# Patient Record
Sex: MALE | Race: WHITE | NOT HISPANIC OR LATINO | Employment: OTHER | URBAN - METROPOLITAN AREA
[De-identification: names, ages, dates, MRNs, and addresses within clinical notes are randomized per-mention and may not be internally consistent; named-entity substitution may affect disease eponyms.]

---

## 2024-05-17 ENCOUNTER — OFFICE VISIT (OUTPATIENT)
Dept: MEDICAL GROUP | Facility: MEDICAL CENTER | Age: 79
End: 2024-05-17
Payer: MEDICARE

## 2024-05-17 VITALS
RESPIRATION RATE: 16 BRPM | OXYGEN SATURATION: 93 % | WEIGHT: 140 LBS | TEMPERATURE: 97.6 F | DIASTOLIC BLOOD PRESSURE: 56 MMHG | HEIGHT: 72 IN | SYSTOLIC BLOOD PRESSURE: 104 MMHG | BODY MASS INDEX: 18.96 KG/M2 | HEART RATE: 87 BPM

## 2024-05-17 DIAGNOSIS — Z12.5 SCREENING PSA (PROSTATE SPECIFIC ANTIGEN): ICD-10-CM

## 2024-05-17 DIAGNOSIS — M15.9 GENERALIZED OA: ICD-10-CM

## 2024-05-17 DIAGNOSIS — N40.1 BENIGN PROSTATIC HYPERPLASIA WITH NOCTURIA: ICD-10-CM

## 2024-05-17 DIAGNOSIS — Z12.83 SKIN CANCER SCREENING: ICD-10-CM

## 2024-05-17 DIAGNOSIS — M81.0 AGE-RELATED OSTEOPOROSIS WITHOUT CURRENT PATHOLOGICAL FRACTURE: ICD-10-CM

## 2024-05-17 DIAGNOSIS — R53.82 CHRONIC FATIGUE: ICD-10-CM

## 2024-05-17 DIAGNOSIS — R35.1 BENIGN PROSTATIC HYPERPLASIA WITH NOCTURIA: ICD-10-CM

## 2024-05-17 DIAGNOSIS — E78.49 OTHER HYPERLIPIDEMIA: ICD-10-CM

## 2024-05-17 DIAGNOSIS — H04.123 DRY EYE SYNDROME OF BOTH EYES: ICD-10-CM

## 2024-05-17 DIAGNOSIS — K58.0 IRRITABLE BOWEL SYNDROME WITH DIARRHEA: ICD-10-CM

## 2024-05-17 PROBLEM — I34.1 MVP (MITRAL VALVE PROLAPSE): Status: ACTIVE | Noted: 2024-05-17

## 2024-05-17 PROCEDURE — 99204 OFFICE O/P NEW MOD 45 MIN: CPT | Performed by: PHYSICIAN ASSISTANT

## 2024-05-17 PROCEDURE — 3078F DIAST BP <80 MM HG: CPT | Performed by: PHYSICIAN ASSISTANT

## 2024-05-17 PROCEDURE — 3074F SYST BP LT 130 MM HG: CPT | Performed by: PHYSICIAN ASSISTANT

## 2024-05-17 RX ORDER — CYCLOSPORINE 0.5 MG/ML
1 EMULSION OPHTHALMIC 2 TIMES DAILY
COMMUNITY

## 2024-05-17 RX ORDER — ATORVASTATIN CALCIUM 10 MG/1
10 TABLET, FILM COATED ORAL DAILY
COMMUNITY
Start: 2024-04-16

## 2024-05-17 RX ORDER — TAMSULOSIN HYDROCHLORIDE 0.4 MG/1
0.8 CAPSULE ORAL
COMMUNITY
Start: 2024-05-10

## 2024-05-17 RX ORDER — TRAMADOL HYDROCHLORIDE 50 MG/1
50 TABLET ORAL
COMMUNITY
Start: 2024-04-13

## 2024-05-17 RX ORDER — CHOLESTYRAMINE 4 G/9G
1 POWDER, FOR SUSPENSION ORAL DAILY
COMMUNITY
Start: 2024-04-15

## 2024-05-17 ASSESSMENT — PATIENT HEALTH QUESTIONNAIRE - PHQ9: CLINICAL INTERPRETATION OF PHQ2 SCORE: 0

## 2024-05-17 NOTE — PROGRESS NOTES
Subjective:     History of Present Illness  The patient is an 79-year-old male who presents to the office to establish care.    The patient is diagnosed with severe osteoarthritis, which manifests in multiple joints. He has been under the care of a pain specialist for several years. His medical history includes a laminectomy, multiple steroid injections, physical therapy, acupuncture, and radiofrequency ablations in his neck and back. Initially, he experienced sciatica radiating down to his left ankle, which was managed with a laminectomy. However, the steroid injections administered by a pain specialist provided significant relief. Despite these interventions, he continues to experience widespread pain. He has undergone several MRIs of his neck and back. He was previously on Fosamax for osteoporosis, which was discontinued after a year. Currently, he is not on any medication, but he believes his bones are not in optimal condition. He is currently managing his pain with tramadol, which he takes twice daily.    The patient is currently on a regimen of Lipitor 10 mg.    The patient has not undergone a prostate blood test within the past year. He reports nocturia, waking up 2 to 3 times per night to urinate. He has previously tried tamsulosin. He also experiences urinary urgency and difficulty controlling his bladder, necessitating the use of pads during the day and at night.    The patient and his spouse visit a dermatologist. During their last dermatology visit, a liquid nitrogen freeze extinguisher is used. He has a history of sun exposure during his childhood.    The patient requests a referral to an ophthalmologist due to an incident where he scratched his cornea.  He has bilateral dry eyes. His corneal scarring has improved since starting Restasis..    The patient believes he may have contracted COVID-19 approximately a year ago. He experienced widespread pain, intense fatigue, altered gait, lightheadedness, mild  nausea, loss of appetite, and general malaise. His balance has not fully recovered, and he lost significant muscle mass and weight. Prior to his COVID-19 infection, he was in good health, engaging in physical activities such as climbing 15 flights of stairs and lifting weights. He enjoys skiing, but his endurance, stamina, and energy levels have decreased. His weight was 140 pounds today, down from 130 pounds last year. His weight has never been above 150 pounds.    Supplemental Information  He was referred to a cardiologist at [facility] for a mitral valve prolapse that he has had for a long time, which has never caused him any trouble, but they want to keep an eye on his mitral valve prolapse.   He is up-to-date on his shingles and pneumonia vaccines.      Current medicines (including changes today)  Current Outpatient Medications   Medication Sig Dispense Refill    atorvastatin (LIPITOR) 10 MG Tab Take 10 mg by mouth every day.      cholestyramine (QUESTRAN) 4 g packet Take 1 Packet by mouth every day.      tamsulosin (FLOMAX) 0.4 MG capsule Take 0.8 mg by mouth at bedtime.      cycloSPORINE (RESTASIS) 0.05 % ophthalmic emulsion Administer 1 Drop into both eyes 2 times a day.      traMADol (ULTRAM) 50 MG Tab Take 50 mg by mouth 2 times a day.       No current facility-administered medications for this visit.     He  has no past medical history on file.    ROS   No chest pain, no shortness of breath, no abdominal pain  Positive ROS as per HPI.  All other systems reviewed and are negative.     Objective:     /56 (BP Location: Right arm, Patient Position: Sitting, BP Cuff Size: Adult)   Pulse 87   Temp 36.4 °C (97.6 °F) (Temporal)   Resp 16   Ht 1.829 m (6')   Wt 63.5 kg (140 lb)   SpO2 93%  Body mass index is 18.99 kg/m².   Physical Exam    Constitutional: Alert, no distress.  Skin: Warm, dry, good turgor, no rashes in visible areas.  Eye: Equal, round and reactive, conjunctiva clear, lids  normal.  ENMT: Lips without lesions, good dentition, oropharynx clear.  Neck: Trachea midline, no masses, no thyromegaly. No cervical or supraclavicular lymphadenopathy  Respiratory: Unlabored respiratory effort, lungs clear to auscultation, no wheezes, no ronchi.  Cardiovascular: Normal S1, S2, no murmur, no edema.  Abdomen: Soft, non-tender, no masses, no hepatosplenomegaly.  Psych: Alert and oriented x3, normal affect and mood.      Results          Assessment and Plan:   The following treatment plan was discussed    Assessment & Plan  1. Osteoarthritis.  The patient's chronic condition remains uncontrolled, however, he is managing well with the administration of tramadol, administered twice daily. The patient has a sufficient supply of tramadol to last for a few more months. A follow-up appointment will be scheduled in 3 months, during which a referral to pain management will be made to establish care.    2. Hypercholesterolemia, the status of which is unknown.  Laboratory tests have been ordered, with instructions for the patient to fast for 8 hours prior to the test. The patient will maintain his current regimen of atorvastatin at 10 mg daily.    3. Benign Prostatic Hyperplasia (BPH) with nocturia and other urinary symptoms.  The patient will maintain his current regimen of Flomax. A referral to urology has been made to establish care.    4. Skin cancer screening.  A referral to dermatology has been made.    5. Dry eye syndrome, bilaterally.  The patient has been referred to ophthalmology and will continue using Restasis as directed.    6. Osteoporosis.  The patient is no longer on Fosamax. A DEXA scan has been ordered.    7. Irritable Bowel Syndrome (IBS) with diarrhea.  The patient will continue taking cholestyramine as directed. No refills are needed today.    8. Chronic fatigue.  The patient's chronic fatigue appears to be secondary to a COVID-19 infection. Laboratory tests have been ordered, with  instructions for the patient to fast for 8 hours prior to the test.      ORDERS:  1. Generalized OA    - traMADol (ULTRAM) 50 MG Tab; Take 50 mg by mouth 2 times a day.  - Referral to Pain Management  - Comp Metabolic Panel; Future    2. Other hyperlipidemia    - atorvastatin (LIPITOR) 10 MG Tab; Take 10 mg by mouth every day.  - Lipid Profile; Future    3. Benign prostatic hyperplasia with nocturia    - tamsulosin (FLOMAX) 0.4 MG capsule; Take 0.8 mg by mouth at bedtime.  - Referral to Urology    4. Skin cancer screening    - Referral to Dermatology    5. Dry eye syndrome of both eyes    - cycloSPORINE (RESTASIS) 0.05 % ophthalmic emulsion; Administer 1 Drop into both eyes 2 times a day.  - Referral to Ophthalmology    6. Age-related osteoporosis without current pathological fracture    - DS-BONE DENSITY STUDY (DEXA); Future    7. Irritable bowel syndrome with diarrhea    - cholestyramine (QUESTRAN) 4 g packet; Take 1 Packet by mouth every day.    8. Chronic fatigue    - CBC WITH DIFFERENTIAL; Future  - Comp Metabolic Panel; Future  - TSH WITH REFLEX TO FT4; Future  - CRP QUANTITIVE (NON-CARDIAC); Future  - Sed Rate; Future    9. Screening PSA (prostate specific antigen)    - PROSTATE SPECIFIC AG SCREENING; Future        Please note that this dictation was created using voice recognition software. I have made every reasonable attempt to correct obvious errors, but I expect that there are errors of grammar and possibly content that I did not discover before finalizing the note.      Attestation      Verbal consent was acquired by the patient to use [employer] ambient listening note generation during this visit Yes

## 2024-05-17 NOTE — LETTER
[facility]  Andrés Ballard P.A.-C.  [address]  [place] [address]  Fax: [phone]   Authorization for Release/Disclosure of   Protected Health Information   Name: CLEM PAGAN : 1945 SSN: [id number]   Address: [address] [place] [address] [place] [address] Phone:    [phone] (home)    I authorize the entity listed below to release/disclose the PHI below to:   [facility]/Andrés Ballard P.A.-C. and Andrés Ballard P.A.-C.   Provider or Entity Name:  [facility]   Address   City, State, Zip   Phone:      Fax:     Reason for request: continuity of care   Information to be released: All medical records   [  ] LAST COLONOSCOPY,  including any PATH REPORT and follow-up  [  ] LAST FIT/COLOGUARD RESULT [  ] LAST DEXA  [  ] LAST MAMMOGRAM  [  ] LAST PAP  [  ] LAST LABS [  ] RETINA EXAM REPORT  [  ] IMMUNIZATION RECORDS  [  ] Release all info      [  ] Check here and initial the line next to each item to release ALL health information INCLUDING  _____ Care and treatment for drug and / or alcohol abuse  _____ HIV testing, infection status, or AIDS  _____ Genetic Testing    DATES OF SERVICE OR TIME PERIOD TO BE DISCLOSED: _____________  I understand and acknowledge that:  * This Authorization may be revoked at any time by you in writing, except if your health information has already been used or disclosed.  * Your health information that will be used or disclosed as a result of you signing this authorization could be re-disclosed by the recipient. If this occurs, your re-disclosed health information may no longer be protected by State or Federal laws.  * You may refuse to sign this Authorization. Your refusal will not affect your ability to obtain treatment.  * This Authorization becomes effective upon signing and will  on (date) __________.      If no date is indicated, this Authorization will  one (1) year from the signature date.    Name: Clem Pagan  Signature:  Date:   5/17/2024     PLEASE FAX REQUESTED RECORDS BACK TO: [phone]

## 2024-06-06 ENCOUNTER — HOSPITAL ENCOUNTER (OUTPATIENT)
Dept: LAB | Facility: MEDICAL CENTER | Age: 79
End: 2024-06-06
Attending: PHYSICIAN ASSISTANT
Payer: MEDICARE

## 2024-06-06 DIAGNOSIS — M15.9 GENERALIZED OA: ICD-10-CM

## 2024-06-06 DIAGNOSIS — Z12.5 SCREENING PSA (PROSTATE SPECIFIC ANTIGEN): ICD-10-CM

## 2024-06-06 DIAGNOSIS — E78.49 OTHER HYPERLIPIDEMIA: ICD-10-CM

## 2024-06-06 DIAGNOSIS — R53.82 CHRONIC FATIGUE: ICD-10-CM

## 2024-06-06 LAB
ALBUMIN SERPL BCP-MCNC: 3.9 G/DL (ref 3.2–4.9)
ALBUMIN/GLOB SERPL: 1.5 G/DL
ALP SERPL-CCNC: 73 U/L (ref 30–99)
ALT SERPL-CCNC: 18 U/L (ref 2–50)
ANION GAP SERPL CALC-SCNC: 10 MMOL/L (ref 7–16)
AST SERPL-CCNC: 17 U/L (ref 12–45)
BASOPHILS # BLD AUTO: 0.9 % (ref 0–1.8)
BASOPHILS # BLD: 0.05 K/UL (ref 0–0.12)
BILIRUB SERPL-MCNC: 1.2 MG/DL (ref 0.1–1.5)
BUN SERPL-MCNC: 23 MG/DL (ref 8–22)
CALCIUM ALBUM COR SERPL-MCNC: 9 MG/DL (ref 8.5–10.5)
CALCIUM SERPL-MCNC: 8.9 MG/DL (ref 8.4–10.2)
CHLORIDE SERPL-SCNC: 101 MMOL/L (ref 96–112)
CHOLEST SERPL-MCNC: 165 MG/DL (ref 100–199)
CO2 SERPL-SCNC: 26 MMOL/L (ref 20–33)
CREAT SERPL-MCNC: 0.63 MG/DL (ref 0.5–1.4)
CRP SERPL HS-MCNC: <0.3 MG/DL (ref 0–0.75)
EOSINOPHIL # BLD AUTO: 0.03 K/UL (ref 0–0.51)
EOSINOPHIL NFR BLD: 0.5 % (ref 0–6.9)
ERYTHROCYTE [DISTWIDTH] IN BLOOD BY AUTOMATED COUNT: 46.3 FL (ref 35.9–50)
ERYTHROCYTE [SEDIMENTATION RATE] IN BLOOD BY WESTERGREN METHOD: 1 MM/HOUR (ref 0–20)
FASTING STATUS PATIENT QL REPORTED: NORMAL
GFR SERPLBLD CREATININE-BSD FMLA CKD-EPI: 96 ML/MIN/1.73 M 2
GLOBULIN SER CALC-MCNC: 2.6 G/DL (ref 1.9–3.5)
GLUCOSE SERPL-MCNC: 99 MG/DL (ref 65–99)
HCT VFR BLD AUTO: 47.2 % (ref 42–52)
HDLC SERPL-MCNC: 64 MG/DL
HGB BLD-MCNC: 15.9 G/DL (ref 14–18)
IMM GRANULOCYTES # BLD AUTO: 0.01 K/UL (ref 0–0.11)
IMM GRANULOCYTES NFR BLD AUTO: 0.2 % (ref 0–0.9)
LDLC SERPL CALC-MCNC: 87 MG/DL
LYMPHOCYTES # BLD AUTO: 1 K/UL (ref 1–4.8)
LYMPHOCYTES NFR BLD: 18.2 % (ref 22–41)
MCH RBC QN AUTO: 33.1 PG (ref 27–33)
MCHC RBC AUTO-ENTMCNC: 33.7 G/DL (ref 32.3–36.5)
MCV RBC AUTO: 98.1 FL (ref 81.4–97.8)
MONOCYTES # BLD AUTO: 0.58 K/UL (ref 0–0.85)
MONOCYTES NFR BLD AUTO: 10.5 % (ref 0–13.4)
NEUTROPHILS # BLD AUTO: 3.83 K/UL (ref 1.82–7.42)
NEUTROPHILS NFR BLD: 69.7 % (ref 44–72)
NRBC # BLD AUTO: 0 K/UL
NRBC BLD-RTO: 0 /100 WBC (ref 0–0.2)
PLATELET # BLD AUTO: 231 K/UL (ref 164–446)
PMV BLD AUTO: 8.9 FL (ref 9–12.9)
POTASSIUM SERPL-SCNC: 4 MMOL/L (ref 3.6–5.5)
PROT SERPL-MCNC: 6.5 G/DL (ref 6–8.2)
PSA SERPL-MCNC: 1.78 NG/ML (ref 0–4)
RBC # BLD AUTO: 4.81 M/UL (ref 4.7–6.1)
SODIUM SERPL-SCNC: 137 MMOL/L (ref 135–145)
TRIGL SERPL-MCNC: 68 MG/DL (ref 0–149)
TSH SERPL DL<=0.005 MIU/L-ACNC: 2.13 UIU/ML (ref 0.38–5.33)
WBC # BLD AUTO: 5.5 K/UL (ref 4.8–10.8)

## 2024-06-06 PROCEDURE — 85025 COMPLETE CBC W/AUTO DIFF WBC: CPT

## 2024-06-06 PROCEDURE — 84153 ASSAY OF PSA TOTAL: CPT

## 2024-06-06 PROCEDURE — 80061 LIPID PANEL: CPT

## 2024-06-06 PROCEDURE — 36415 COLL VENOUS BLD VENIPUNCTURE: CPT

## 2024-06-06 PROCEDURE — 84443 ASSAY THYROID STIM HORMONE: CPT

## 2024-06-06 PROCEDURE — 80053 COMPREHEN METABOLIC PANEL: CPT

## 2024-06-06 PROCEDURE — 85652 RBC SED RATE AUTOMATED: CPT

## 2024-06-06 PROCEDURE — 86140 C-REACTIVE PROTEIN: CPT

## 2024-06-11 ENCOUNTER — APPOINTMENT (RX ONLY)
Dept: URBAN - METROPOLITAN AREA CLINIC 15 | Facility: CLINIC | Age: 79
Setting detail: DERMATOLOGY
End: 2024-06-11

## 2024-06-11 DIAGNOSIS — L81.4 OTHER MELANIN HYPERPIGMENTATION: ICD-10-CM

## 2024-06-11 DIAGNOSIS — D18.0 HEMANGIOMA: ICD-10-CM

## 2024-06-11 DIAGNOSIS — B07.0 PLANTAR WART: ICD-10-CM

## 2024-06-11 DIAGNOSIS — R63.4 ABNORMAL WEIGHT LOSS: ICD-10-CM

## 2024-06-11 DIAGNOSIS — Z71.89 OTHER SPECIFIED COUNSELING: ICD-10-CM

## 2024-06-11 DIAGNOSIS — D22 MELANOCYTIC NEVI: ICD-10-CM

## 2024-06-11 DIAGNOSIS — L82.1 OTHER SEBORRHEIC KERATOSIS: ICD-10-CM

## 2024-06-11 DIAGNOSIS — L57.0 ACTINIC KERATOSIS: ICD-10-CM

## 2024-06-11 DIAGNOSIS — D69.2 OTHER NONTHROMBOCYTOPENIC PURPURA: ICD-10-CM

## 2024-06-11 DIAGNOSIS — L21.8 OTHER SEBORRHEIC DERMATITIS: ICD-10-CM | Status: INADEQUATELY CONTROLLED

## 2024-06-11 PROBLEM — D22.5 MELANOCYTIC NEVI OF TRUNK: Status: ACTIVE | Noted: 2024-06-11

## 2024-06-11 PROBLEM — D18.01 HEMANGIOMA OF SKIN AND SUBCUTANEOUS TISSUE: Status: ACTIVE | Noted: 2024-06-11

## 2024-06-11 PROCEDURE — ? DIAGNOSIS COMMENT

## 2024-06-11 PROCEDURE — ? PRESCRIPTION

## 2024-06-11 PROCEDURE — ? LIQUID NITROGEN

## 2024-06-11 PROCEDURE — 99204 OFFICE O/P NEW MOD 45 MIN: CPT | Mod: 25

## 2024-06-11 PROCEDURE — 17000 DESTRUCT PREMALG LESION: CPT

## 2024-06-11 PROCEDURE — ? COUNSELING

## 2024-06-11 PROCEDURE — 17003 DESTRUCT PREMALG LES 2-14: CPT

## 2024-06-11 PROCEDURE — ? REFERRAL CORRESPONDENCE

## 2024-06-11 RX ORDER — KETOCONAZOLE 20 MG/G
CREAM TOPICAL BID
Qty: 60 | Refills: 11 | Status: ERX | COMMUNITY
Start: 2024-06-11

## 2024-06-11 RX ADMIN — KETOCONAZOLE: 20 CREAM TOPICAL at 00:00

## 2024-06-11 ASSESSMENT — LOCATION SIMPLE DESCRIPTION DERM
LOCATION SIMPLE: CHEST
LOCATION SIMPLE: LEFT FOREHEAD
LOCATION SIMPLE: LEFT FOREARM
LOCATION SIMPLE: RIGHT LOWER BACK
LOCATION SIMPLE: RIGHT CHEEK
LOCATION SIMPLE: LEFT CHEEK
LOCATION SIMPLE: INFERIOR FOREHEAD
LOCATION SIMPLE: LEFT PLANTAR SURFACE
LOCATION SIMPLE: RIGHT FOREHEAD
LOCATION SIMPLE: LEFT UPPER BACK

## 2024-06-11 ASSESSMENT — LOCATION ZONE DERM
LOCATION ZONE: ARM
LOCATION ZONE: FACE
LOCATION ZONE: TRUNK
LOCATION ZONE: FEET

## 2024-06-11 ASSESSMENT — LOCATION DETAILED DESCRIPTION DERM
LOCATION DETAILED: LEFT INFERIOR MEDIAL FOREHEAD
LOCATION DETAILED: LEFT MEDIAL MALAR CHEEK
LOCATION DETAILED: LEFT PLANTAR FOREFOOT OVERLYING 4TH METATARSAL
LOCATION DETAILED: RIGHT MEDIAL FOREHEAD
LOCATION DETAILED: INFERIOR MID FOREHEAD
LOCATION DETAILED: RIGHT MEDIAL MALAR CHEEK
LOCATION DETAILED: LEFT INFERIOR UPPER BACK
LOCATION DETAILED: RIGHT SUPERIOR LATERAL MIDBACK
LOCATION DETAILED: RIGHT MEDIAL INFERIOR CHEST
LOCATION DETAILED: LEFT VENTRAL LATERAL PROXIMAL FOREARM
LOCATION DETAILED: LEFT CENTRAL MALAR CHEEK

## 2024-06-11 ASSESSMENT — SEVERITY ASSESSMENT: HOW SEVERE IS THIS PATIENT'S CONDITION?: MODERATE

## 2024-06-11 NOTE — PROCEDURE: LIQUID NITROGEN
Duration Of Freeze Thaw-Cycle (Seconds): 0
Post-Care Instructions: I reviewed with the patient in detail post-care instructions. Patient is to wear sunprotection, and avoid picking at any of the treated lesions. Pt may apply Vaseline to crusted or scabbing areas.
Render Post-Care Instructions In Note?: no
Detail Level: Simple
Consent: The patient's consent was obtained including but not limited to risks of crusting, scabbing, blistering, scarring, darker or lighter pigmentary change, recurrence, incomplete removal and infection.
Show Applicator Variable?: Yes

## 2024-06-11 NOTE — PROCEDURE: DIAGNOSIS COMMENT
Comment: Pt reports weight and muscle loss over the last 1-2 years; he believes this may be due to long COVID. He has established care with primary care since moving to [place] and was referred to PM&R. \\nNo fevers, chills, night sweats, or other concerning systemic symptoms.\\nRecommended follow up with PCP and age-appropriate cancer screenings.
Detail Level: Zone
Render Risk Assessment In Note?: no

## 2024-06-13 NOTE — PROGRESS NOTES
Reason for visit:   BPH with nocturia    HPI   Clem Soto is a 79 y.o. male presenting for urologic evaluation of LUTs associated with BPH. The patient takes tamsulosin 0.8 mg at bedtime to manage symptoms but still finds that his urinary pattern is bothersome. He had been prescribed this medication by his PCP for years. He just moved to [place] from [place] and is looking to become established with providers at [facility].    He is most bothered by nocturia x2 on average and urinary urgency. He also experiences some urge incontinence. He feels that during the day he is always conscious of where the next toilet is. He does feel that these symptoms are negatively interfering with his daily life.     The patient reports over the last 13 months that he has lost a lot of weight and feels that he is losing muscle mass. He also feels very low energy. He is being investigated for this and has had labs including testosterone which is normal. He has mentioned this to his PCP and is undergoing investigations still.     He denies any history of kidney stones or recurrent UTIs. He denies any family history of  CA. He denies any history of visible hematuria or dysuria.     The patient is an avid skier and his goal is to be able to ski this winter with his daughter. He does lift weights but does struggle with the fatigue.     Caffeine: 1 cup of coffee in AM   Water: Canned seltzer, 2 glasses of milk  Alcohol: 1 Guinness    PVR: 0 mL    Prostatic Specific Antigen Tot   Date Value Ref Range Status   06/06/2024 1.78 0.00 - 4.00 ng/mL Final     Comment:     Performed using Roche isaias e immunoassay analyzer. tPSA values determined  on patient samples by different testing procedures cannot be directly  compared with one another and could be the cause of erroneous medical  interpretations. If there is a change in the tPSA assay procedure used while  monitoring therapy, then new baselines may need to be established  when  comparing previous results.       No past medical history on file.    PMH:   Lumbar disc disease   MVP (mitral valve prolapse)   Osteopenia      Past Surgical History:   Procedure Laterality Date    LAMINOTOMY         Urologic PMH:    Denies     Family History:   Denies     Social History     Socioeconomic History    Marital status:      Spouse name: Not on file    Number of children: Not on file    Years of education: Not on file    Highest education level: Not on file   Occupational History    Not on file   Tobacco Use    Smoking status: Never    Smokeless tobacco: Never   Vaping Use    Vaping status: Never Used   Substance and Sexual Activity    Alcohol use: Yes     Comment: 2-3 beers weekly    Drug use: Not Currently    Sexual activity: Not on file     Comment: , 1 daughter, no grands   Other Topics Concern    Not on file   Social History Narrative    Not on file     Social Determinants of Health     Financial Resource Strain: Low Risk  (11/12/2023)    Received from [employer]    Overall Financial Resource Strain (CARDIA)     Difficulty of Paying Living Expenses: Not hard at all   Food Insecurity: No Food Insecurity (11/12/2023)    Received from [employer]    Hunger Vital Sign     Worried About Running Out of Food in the Last Year: Never true     Ran Out of Food in the Last Year: Never true   Transportation Needs: No Transportation Needs (11/12/2023)    Received from [employer]    PRAPARE - Transportation     Lack of Transportation (Medical): No     Lack of Transportation (Non-Medical): No   Physical Activity: Insufficiently Active (11/12/2023)    Received from [employer]    Exercise Vital Sign     Days of Exercise per Week: 3 days     Minutes of Exercise per Session: 30 min   Stress: No Stress Concern Present (11/12/2023)    Received from [employer]    [employer] - Occupational Stress Questionnaire     Feeling of Stress : Not at all   Social Connections:  Moderately Integrated (11/12/2023)    Received from [employer]    Social Connection and Isolation Panel [NHANES]     Frequency of Communication with Friends and Family: Twice a week     Frequency of Social Gatherings with Friends and Family: Once a week     Attends [employer] Services: Never     Active Member of Clubs or Organizations: No     Attends Club or Organization Meetings: 1 to 4 times per year     Marital Status:    Intimate Partner Violence: Not At Risk (11/12/2023)    [facility]    Humiliation, Afraid, Rape, and Kick questionnaire     Fear of Current or Ex-Partner: No     Emotionally Abused: No     Physically Abused: No     Sexually Abused: No   Housing Stability: Low Risk  (11/12/2023)    [facility]    Housing Stability Vital Sign     Unable to Pay for Housing in the Last Year: No     Number of Places Lived in the Last Year: 1     Unstable Housing in the Last Year: No         No Known Allergies      Current Outpatient Medications   Medication Sig Dispense Refill    atorvastatin (LIPITOR) 10 MG Tab Take 10 mg by mouth every day.      cholestyramine (QUESTRAN) 4 g packet Take 1 Packet by mouth every day.      tamsulosin (FLOMAX) 0.4 MG capsule Take 0.8 mg by mouth at bedtime.      cycloSPORINE (RESTASIS) 0.05 % ophthalmic emulsion Administer 1 Drop into both eyes 2 times a day.      traMADol (ULTRAM) 50 MG Tab Take 50 mg by mouth 2 times a day.       No current facility-administered medications for this visit.       Review of Systems:   GENERAL: No fever, chills, nausea, vomiting. +Exhaustion -- x13 months  RESPIRATORY: No shortness of breath, wheezing, or cough   CARDIAC: No chest pain, palpitations, irregular heart rhythm, or chest pressure   GASTROINTESTINAL: No abdominal pain or distention    GENITROURINARY: See HPI   NEUROLOGIC: No weakness, blackouts, seizure or stroke   HEMATOLOGIC: No history of bleeding disorders, hypercoagulability, DVT, or PE   ENDOCRINE: No  history of diabetes, adrenal disorders, or thyroid disorders    All other review of systems was negative     There were no vitals filed for this visit.      Physical Exam:   General:  Alert & Oriented, NAD   Skin: Warm/Dry, no evidence of infection or rash   HEENT: normocephalic   CV:  Regular rate and rhythm, no murmurs, rubs, or gallops   Lungs: CTA, no wheeze, rhonchi, or rubs   Thorax: No CVA tenderness   Abdomen: Soft, nontender to superficial/deep palpation, nondistended, positive bowel sounds, no incisional scars   Extremities: No distal edema     Assessment and plan:     BPH with nocturia    -- Recommend reducing tamsulosin from 0.8 mg daily to 0.4 mg daily given ongoing complaints of dizziness and vertigo  -- We reviewed daytime fluid management in detail with a goal to drink 48-64 ounces of water daily and to decrease caffeine consumption  -- We reviewed nocturia guidelines in detail  -- Option to start trospium 20 mg at bedtime for nocturia -- we reviewed this medication MOA and common side effects associated -- [facility]  -- Follow-up in 4-6 weeks for review of symptoms    Chronic fatigue      -- I personally reviewed testosterone levels from 9/2023 and they are WNL -- patient unsure whether this was an early morning lab  -- Will repeat testosterone levels and add SHBG, FSH/LH and estradiol for review -- patient advised that this must be early morning labs  -- To touch base with patient's PCP regarding severity of symptoms     I personally reviewed the patient's pertinent medical records and labs/images available to me.     Iesha Howard PA-C

## 2024-06-14 ENCOUNTER — OFFICE VISIT (OUTPATIENT)
Dept: UROLOGY | Facility: MEDICAL CENTER | Age: 79
End: 2024-06-14
Payer: MEDICARE

## 2024-06-14 DIAGNOSIS — N40.1 BENIGN PROSTATIC HYPERPLASIA WITH NOCTURIA: Primary | ICD-10-CM

## 2024-06-14 DIAGNOSIS — R35.1 BENIGN PROSTATIC HYPERPLASIA WITH NOCTURIA: Primary | ICD-10-CM

## 2024-06-14 DIAGNOSIS — R53.82 CHRONIC FATIGUE: ICD-10-CM

## 2024-06-14 DIAGNOSIS — N39.41 URGENCY INCONTINENCE: ICD-10-CM

## 2024-06-14 LAB
POC POST-VOID: NORMAL
POC PRE-VOID: 0 ML

## 2024-06-14 PROCEDURE — 99204 OFFICE O/P NEW MOD 45 MIN: CPT | Performed by: PHYSICIAN ASSISTANT

## 2024-06-14 PROCEDURE — 51798 US URINE CAPACITY MEASURE: CPT | Performed by: PHYSICIAN ASSISTANT

## 2024-06-14 RX ORDER — TROSPIUM CHLORIDE 20 MG/1
20 TABLET, FILM COATED ORAL
Qty: 30 TABLET | Refills: 2 | Status: SHIPPED | OUTPATIENT
Start: 2024-06-14

## 2024-06-14 NOTE — PATIENT INSTRUCTIONS
Fluid management:     -- Drink two 8oz cups of fluid with each meal   -- Drink one 8oz cup of fluid between meals (finish glass within 15 minutes)   -- Avoid sipping   -- Nothing to drink after supper (a small sip with nighttime medications is okay)     Preferred fluid is still water!      Nighttime Urination (Nocturia) Guidelines:   Try these recommendations for at least 14 days to see if they are helpful:     Avoid all fluids with caffeine for 6 - 8 hours before bedtime.   Avoid all alcoholic beverages for 6 - 8 hours before bedtime.   Do not drink ANY fluids for 3 hours before bedtime (a small sip of fluid with nighttime medications is okay).   Do not have any high liquid content food for 3 hours before bedtime (ex. Fruits like grapes and melons, Jello, ice cream and yogurt).   Do not drink fluid during the night.   Completely empty your bladder before bedtime.

## 2024-06-21 DIAGNOSIS — R11.0 CHRONIC NAUSEA: ICD-10-CM

## 2024-06-27 ENCOUNTER — HOSPITAL ENCOUNTER (OUTPATIENT)
Dept: LAB | Facility: MEDICAL CENTER | Age: 79
End: 2024-06-27
Attending: PHYSICIAN ASSISTANT
Payer: MEDICARE

## 2024-06-27 DIAGNOSIS — R53.82 CHRONIC FATIGUE: ICD-10-CM

## 2024-06-27 LAB
ESTRADIOL SERPL-MCNC: 26.4 PG/ML
FSH SERPL-ACNC: 32.2 MIU/ML (ref 1.5–12.4)
LH SERPL-ACNC: 11.5 IU/L (ref 1.7–8.6)

## 2024-06-27 PROCEDURE — 84402 ASSAY OF FREE TESTOSTERONE: CPT

## 2024-06-27 PROCEDURE — 82670 ASSAY OF TOTAL ESTRADIOL: CPT

## 2024-06-27 PROCEDURE — 83001 ASSAY OF GONADOTROPIN (FSH): CPT

## 2024-06-27 PROCEDURE — 83002 ASSAY OF GONADOTROPIN (LH): CPT

## 2024-06-27 PROCEDURE — 36415 COLL VENOUS BLD VENIPUNCTURE: CPT

## 2024-06-27 PROCEDURE — 84270 ASSAY OF SEX HORMONE GLOBUL: CPT

## 2024-06-27 PROCEDURE — 84403 ASSAY OF TOTAL TESTOSTERONE: CPT

## 2024-06-29 LAB
SHBG SERPL-SCNC: 90 NMOL/L (ref 19–76)
TESTOST FREE MFR SERPL: 1 % (ref 1.6–2.9)
TESTOST FREE SERPL-MCNC: 62 PG/ML (ref 47–244)
TESTOST SERPL-MCNC: 618 NG/DL (ref 300–720)
TESTOSTERONE.FREE+WB SERPL-MCNC: 158 NG/DL (ref 131–682)

## 2024-07-08 ENCOUNTER — OFFICE VISIT (OUTPATIENT)
Dept: URGENT CARE | Facility: CLINIC | Age: 79
End: 2024-07-08
Payer: MEDICARE

## 2024-07-08 VITALS
HEIGHT: 72 IN | TEMPERATURE: 98.3 F | OXYGEN SATURATION: 94 % | SYSTOLIC BLOOD PRESSURE: 124 MMHG | DIASTOLIC BLOOD PRESSURE: 70 MMHG | HEART RATE: 91 BPM | BODY MASS INDEX: 18.28 KG/M2 | WEIGHT: 135 LBS | RESPIRATION RATE: 16 BRPM

## 2024-07-08 DIAGNOSIS — Z13.1 ENCOUNTER FOR SCREENING FOR DIABETES MELLITUS: ICD-10-CM

## 2024-07-08 DIAGNOSIS — H61.23 BILATERAL IMPACTED CERUMEN: ICD-10-CM

## 2024-07-08 DIAGNOSIS — R63.4 WEIGHT LOSS: ICD-10-CM

## 2024-07-08 PROCEDURE — 99213 OFFICE O/P EST LOW 20 MIN: CPT | Mod: 25 | Performed by: PHYSICIAN ASSISTANT

## 2024-07-08 PROCEDURE — 3078F DIAST BP <80 MM HG: CPT | Performed by: PHYSICIAN ASSISTANT

## 2024-07-08 PROCEDURE — 3074F SYST BP LT 130 MM HG: CPT | Performed by: PHYSICIAN ASSISTANT

## 2024-07-08 PROCEDURE — 69210 REMOVE IMPACTED EAR WAX UNI: CPT | Mod: 50 | Performed by: PHYSICIAN ASSISTANT

## 2024-07-08 ASSESSMENT — ENCOUNTER SYMPTOMS
DIZZINESS: 0
SORE THROAT: 0
DIARRHEA: 0
VOMITING: 0
SHORTNESS OF BREATH: 0
ABDOMINAL PAIN: 0
EYE REDNESS: 0
DIAPHORESIS: 0
HEADACHES: 0
SINUS PAIN: 0
NAUSEA: 1
FEVER: 0
CONSTIPATION: 0
COUGH: 0
CHILLS: 0
WEIGHT LOSS: 1
WHEEZING: 0
EYE DISCHARGE: 0
EYE PAIN: 0

## 2024-07-08 ASSESSMENT — FIBROSIS 4 INDEX: FIB4 SCORE: 1.39

## 2024-07-10 ENCOUNTER — TELEMEDICINE (OUTPATIENT)
Dept: UROLOGY | Facility: MEDICAL CENTER | Age: 79
End: 2024-07-10
Payer: MEDICARE

## 2024-07-10 DIAGNOSIS — R53.82 CHRONIC FATIGUE: ICD-10-CM

## 2024-07-10 DIAGNOSIS — R35.1 BENIGN PROSTATIC HYPERPLASIA WITH NOCTURIA: Primary | ICD-10-CM

## 2024-07-10 DIAGNOSIS — N39.41 URGENCY INCONTINENCE: ICD-10-CM

## 2024-07-10 DIAGNOSIS — N40.1 BENIGN PROSTATIC HYPERPLASIA WITH NOCTURIA: Primary | ICD-10-CM

## 2024-07-10 PROCEDURE — 99214 OFFICE O/P EST MOD 30 MIN: CPT | Mod: 95 | Performed by: PHYSICIAN ASSISTANT

## 2024-07-10 RX ORDER — TROSPIUM CHLORIDE 20 MG/1
20 TABLET, FILM COATED ORAL
Qty: 30 TABLET | Refills: 2 | Status: SHIPPED | OUTPATIENT
Start: 2024-07-10

## 2024-07-10 RX ORDER — FINASTERIDE 5 MG/1
5 TABLET, FILM COATED ORAL DAILY
Qty: 90 TABLET | Refills: 3 | Status: SHIPPED | OUTPATIENT
Start: 2024-07-10

## 2024-07-19 ENCOUNTER — PATIENT MESSAGE (OUTPATIENT)
Dept: UROLOGY | Facility: MEDICAL CENTER | Age: 79
End: 2024-07-19

## 2024-07-19 DIAGNOSIS — R35.1 BENIGN PROSTATIC HYPERPLASIA WITH NOCTURIA: ICD-10-CM

## 2024-07-19 DIAGNOSIS — N39.41 URGENCY INCONTINENCE: ICD-10-CM

## 2024-07-19 DIAGNOSIS — N40.1 BENIGN PROSTATIC HYPERPLASIA WITH NOCTURIA: ICD-10-CM

## 2024-07-25 ENCOUNTER — APPOINTMENT (OUTPATIENT)
Dept: RADIOLOGY | Facility: MEDICAL CENTER | Age: 79
End: 2024-07-25
Payer: MEDICARE

## 2024-07-25 ENCOUNTER — HOSPITAL ENCOUNTER (OUTPATIENT)
Dept: RADIOLOGY | Facility: MEDICAL CENTER | Age: 79
End: 2024-07-25
Payer: MEDICARE

## 2024-07-25 DIAGNOSIS — R11.0 CHRONIC NAUSEA: ICD-10-CM

## 2024-07-25 DIAGNOSIS — R63.4 WEIGHT LOSS: ICD-10-CM

## 2024-07-25 PROCEDURE — 700117 HCHG RX CONTRAST REV CODE 255

## 2024-07-25 PROCEDURE — 71260 CT THORAX DX C+: CPT

## 2024-07-25 RX ADMIN — IOHEXOL 100 ML: 350 INJECTION, SOLUTION INTRAVENOUS at 12:33

## 2024-08-16 ENCOUNTER — OFFICE VISIT (OUTPATIENT)
Dept: MEDICAL GROUP | Facility: MEDICAL CENTER | Age: 79
End: 2024-08-16
Payer: MEDICARE

## 2024-08-16 VITALS
DIASTOLIC BLOOD PRESSURE: 54 MMHG | SYSTOLIC BLOOD PRESSURE: 100 MMHG | TEMPERATURE: 98 F | HEART RATE: 80 BPM | RESPIRATION RATE: 16 BRPM | BODY MASS INDEX: 18.15 KG/M2 | WEIGHT: 134 LBS | HEIGHT: 72 IN | OXYGEN SATURATION: 95 %

## 2024-08-16 DIAGNOSIS — M15.9 GENERALIZED OA: ICD-10-CM

## 2024-08-16 DIAGNOSIS — R91.8 ABNORMAL CT SCAN OF LUNG: ICD-10-CM

## 2024-08-16 DIAGNOSIS — R06.02 SHORTNESS OF BREATH: ICD-10-CM

## 2024-08-16 DIAGNOSIS — K40.90 RIGHT INGUINAL HERNIA: ICD-10-CM

## 2024-08-16 DIAGNOSIS — Z79.891 CHRONIC USE OF OPIATE FOR THERAPEUTIC PURPOSE: ICD-10-CM

## 2024-08-16 DIAGNOSIS — E78.49 OTHER HYPERLIPIDEMIA: ICD-10-CM

## 2024-08-16 DIAGNOSIS — R63.4 WEIGHT LOSS: ICD-10-CM

## 2024-08-16 DIAGNOSIS — M67.431 GANGLION CYST OF WRIST, RIGHT: ICD-10-CM

## 2024-08-16 PROCEDURE — 3074F SYST BP LT 130 MM HG: CPT | Performed by: PHYSICIAN ASSISTANT

## 2024-08-16 PROCEDURE — 99214 OFFICE O/P EST MOD 30 MIN: CPT | Performed by: PHYSICIAN ASSISTANT

## 2024-08-16 PROCEDURE — 3078F DIAST BP <80 MM HG: CPT | Performed by: PHYSICIAN ASSISTANT

## 2024-08-16 RX ORDER — TRAMADOL HYDROCHLORIDE 50 MG/1
50 TABLET ORAL EVERY 8 HOURS PRN
Qty: 21 TABLET | Refills: 1 | Status: SHIPPED | OUTPATIENT
Start: 2024-08-16 | End: 2024-08-30

## 2024-08-16 RX ORDER — FAMOTIDINE 40 MG/1
40 TABLET, FILM COATED ORAL DAILY
COMMUNITY
Start: 2024-07-12

## 2024-08-16 RX ORDER — ATORVASTATIN CALCIUM 10 MG/1
10 TABLET, FILM COATED ORAL DAILY
Qty: 90 TABLET | Refills: 3 | Status: SHIPPED | OUTPATIENT
Start: 2024-08-16 | End: 2025-08-11

## 2024-08-16 ASSESSMENT — FIBROSIS 4 INDEX: FIB4 SCORE: 1.39

## 2024-08-16 NOTE — PROGRESS NOTES
Subjective:     History of Present Illness  The patient presents for evaluation of multiple medical concerns.    He has noticed a cyst on his right wrist that has been present for 2 weeks. He recalls having similar cysts decades ago. The cyst is not causing him any discomfort.    He reports unexplained weight loss, with a decrease of 1 pound since his last visit. His current weight is 134 pounds. He is under the care of a gastroenterologist who has ordered a CT scan. He expresses concern about his muscle mass deterioration and overall thin appearance. He also mentions a spot on his lung identified during a previous scan. He has an upcoming appointment with Dr. Agarwal from endocrinology next week. He has been prescribed famotidine for stomachaches, which he reports as somewhat helpful. He does not have a history of smoking but experiences shortness of breath. He also reports fatigue, stating that he tires easily even after light activities such as cutting grass, a task he could do without difficulty two years ago. He is not experiencing any chest pain.    He has noticed a mild bump in his groin area, which he suspects might be a hernia. However, it is not causing him any discomfort.    He has been diagnosed with osteoarthritis. He was prescribed tramadol 50 mg twice daily by a doctor in [place] in either March or April 2024. He has undergone back surgery and physical therapy in the past.    He is scheduled for an MRI of the lower back on 09/23/2024.      Current medicines (including changes today)  Current Outpatient Medications   Medication Sig Dispense Refill    famotidine (PEPCID) 40 MG Tab Take 40 mg by mouth every day.      traMADol (ULTRAM) 50 MG Tab Take 1 Tablet by mouth every 8 hours as needed for Moderate Pain for up to 14 days. 21 Tablet 1    atorvastatin (LIPITOR) 10 MG Tab Take 1 Tablet by mouth every day for 360 days. 90 Tablet 3    finasteride (PROSCAR) 5 MG Tab Take 1 Tablet by mouth every day. 90  Tablet 3    trospium (SANCTURA) 20 MG Tab Take 1 Tablet by mouth at bedtime. 30 Tablet 2    cholestyramine (QUESTRAN) 4 g packet Take 1 Packet by mouth every day.      cycloSPORINE (RESTASIS) 0.05 % ophthalmic emulsion Administer 1 Drop into both eyes 2 times a day.       No current facility-administered medications for this visit.     He  has no past medical history on file.    ROS   No chest pain, no shortness of breath, no abdominal pain  Positive ROS as per HPI.  All other systems reviewed and are negative.     Objective:     /54 (BP Location: Left arm, Patient Position: Sitting, BP Cuff Size: Adult)   Pulse 80   Temp 36.7 °C (98 °F) (Temporal)   Resp 16   Ht 1.829 m (6')   Wt 60.8 kg (134 lb)   SpO2 95%  Body mass index is 18.17 kg/m².   Physical Exam    Constitutional: Alert, no distress.  Skin: Warm, dry, good turgor, no rashes in visible areas.  Eye: Equal, round and reactive, conjunctiva clear, lids normal.  ENMT: Lips without lesions, good dentition, oropharynx clear.  Neck: Trachea midline, no masses, no thyromegaly.   Psych: Alert and oriented x3, normal affect and mood.      Results  Imaging  CT scan showed changes in the lungs indicative of emphysema.        Assessment and Plan:   The following treatment plan was discussed    Assessment & Plan  1. Ganglion cyst of the right wrist.  The cyst is currently asymptomatic and may resolve spontaneously over time. Ongoing monitoring will be maintained. If the condition worsens, he has been advised to contact via [employer].    2. Weight loss.  A weight loss of 6 pounds has been noted since May 2024. He is under the care of a gastroenterologist. A recent CT scan did not reveal any significant concerns. In light of the lung findings, a referral to pulmonology has been made. He is also scheduled for an MRI of the lower back on 08/23/2024.    3. Right inguinal hernia.  A mild lesion has been identified, which is currently asymptomatic. Continued  monitoring will be carried out. If it becomes painful, an ultrasound will be performed, and a referral to a general surgeon will be made.    4. Osteoarthritis.  This is a chronic condition. The  has been reviewed. A urine drug screen has been ordered, to be completed at a later date. A 7-day supply of tramadol 50 mg, totaling 21 tablets, has been prescribed for use as needed. The prescription has been sent to [facility].    5. Shortness of breath.  This is a chronic condition with an unknown cause. A referral to pulmonology has been made. A CT cardiac scoring has also been ordered to assess for potential plaque in the heart. He will follow up with imaging.    6. Emphysema.  Despite no history of smoking, emphysema changes have been noted in the lungs. A referral to pulmonology has been made to further investigate the cause and manage the condition.    7. Medication Management.  A prescription for atorvastatin 10 mg once daily has been sent over with a 90-day supply and three refills, totaling a year's supply.    Follow-up  A follow-up visit is scheduled in 3 months.      ORDERS:  1. Ganglion cyst of wrist, right      2. Weight loss      3. Right inguinal hernia      4. Generalized OA    - traMADol (ULTRAM) 50 MG Tab; Take 1 Tablet by mouth every 8 hours as needed for Moderate Pain for up to 14 days.  Dispense: 21 Tablet; Refill: 1    5. Chronic use of opiate for therapeutic purpose    - Controlled Substance Treatment Agreement  - URINE DRUG SCREEN W/CONF (SEND OUT); Future    6. Shortness of breath    - Referral to Pulmonary and Sleep Medicine    7. Abnormal CT scan of lung    - Referral to Pulmonary and Sleep Medicine    8. Other hyperlipidemia    - atorvastatin (LIPITOR) 10 MG Tab; Take 1 Tablet by mouth every day for 360 days.  Dispense: 90 Tablet; Refill: 3  - CT-CARDIAC SCORING; Future        Please note that this dictation was created using voice recognition software. I have made every reasonable attempt to  correct obvious errors, but I expect that there are errors of grammar and possibly content that I did not discover before finalizing the note.      Attestation      Verbal consent was acquired by the patient to use [employer] ambient listening note generation during this visit Yes

## 2024-08-19 RX ORDER — FINASTERIDE 5 MG/1
5 TABLET, FILM COATED ORAL DAILY
Qty: 90 TABLET | Refills: 3 | Status: SHIPPED | OUTPATIENT
Start: 2024-08-19

## 2024-08-19 RX ORDER — TROSPIUM CHLORIDE 20 MG/1
20 TABLET, FILM COATED ORAL
Qty: 30 TABLET | Refills: 2 | Status: SHIPPED | OUTPATIENT
Start: 2024-08-19

## 2024-08-20 ENCOUNTER — HOSPITAL ENCOUNTER (OUTPATIENT)
Dept: LAB | Facility: MEDICAL CENTER | Age: 79
End: 2024-08-20
Attending: PHYSICIAN ASSISTANT
Payer: COMMERCIAL

## 2024-08-20 ENCOUNTER — PATIENT MESSAGE (OUTPATIENT)
Dept: MEDICAL GROUP | Facility: MEDICAL CENTER | Age: 79
End: 2024-08-20

## 2024-08-20 ENCOUNTER — HOSPITAL ENCOUNTER (OUTPATIENT)
Dept: RADIOLOGY | Facility: MEDICAL CENTER | Age: 79
End: 2024-08-20
Attending: PHYSICIAN ASSISTANT
Payer: COMMERCIAL

## 2024-08-20 DIAGNOSIS — M15.9 GENERALIZED OA: ICD-10-CM

## 2024-08-20 DIAGNOSIS — R53.82 CHRONIC FATIGUE: ICD-10-CM

## 2024-08-20 DIAGNOSIS — Z13.1 ENCOUNTER FOR SCREENING FOR DIABETES MELLITUS: ICD-10-CM

## 2024-08-20 DIAGNOSIS — E78.49 OTHER HYPERLIPIDEMIA: ICD-10-CM

## 2024-08-20 DIAGNOSIS — R63.4 WEIGHT LOSS: ICD-10-CM

## 2024-08-20 DIAGNOSIS — Z79.891 CHRONIC USE OF OPIATE FOR THERAPEUTIC PURPOSE: ICD-10-CM

## 2024-08-20 PROBLEM — R93.1 AGATSTON CORONARY ARTERY CALCIUM SCORE GREATER THAN 400: Status: ACTIVE | Noted: 2024-08-20

## 2024-08-20 LAB
EST. AVERAGE GLUCOSE BLD GHB EST-MCNC: 114 MG/DL
HBA1C MFR BLD: 5.6 % (ref 4–5.6)
PROLACTIN SERPL-MCNC: 5.89 NG/ML (ref 2.1–17.7)

## 2024-08-20 PROCEDURE — 36415 COLL VENOUS BLD VENIPUNCTURE: CPT

## 2024-08-20 PROCEDURE — 80307 DRUG TEST PRSMV CHEM ANLYZR: CPT

## 2024-08-20 PROCEDURE — 83036 HEMOGLOBIN GLYCOSYLATED A1C: CPT

## 2024-08-20 PROCEDURE — 84146 ASSAY OF PROLACTIN: CPT

## 2024-08-20 PROCEDURE — 4410556 CT-CARDIAC SCORING (SELF PAY ONLY)

## 2024-08-20 PROCEDURE — 700117 HCHG RX CONTRAST REV CODE 255: Performed by: PHYSICIAN ASSISTANT

## 2024-08-21 DIAGNOSIS — R93.1 AGATSTON CORONARY ARTERY CALCIUM SCORE GREATER THAN 400: ICD-10-CM

## 2024-08-21 DIAGNOSIS — E78.49 OTHER HYPERLIPIDEMIA: ICD-10-CM

## 2024-08-22 LAB
AMPHET CTO UR CFM-MCNC: NEGATIVE NG/ML
BARBITURATES CTO UR CFM-MCNC: NEGATIVE NG/ML
BENZODIAZ CTO UR CFM-MCNC: NEGATIVE NG/ML
CANNABINOIDS CTO UR CFM-MCNC: NEGATIVE NG/ML
COCAINE CTO UR CFM-MCNC: NEGATIVE NG/ML
CREAT UR-MCNC: 147.2 MG/DL (ref 20–400)
DRUG COMMENT 753798: NORMAL
METHADONE CTO UR CFM-MCNC: NEGATIVE NG/ML
OPIATES CTO UR CFM-MCNC: NEGATIVE NG/ML
PCP CTO UR CFM-MCNC: NEGATIVE NG/ML
PROPOXYPH CTO UR CFM-MCNC: NEGATIVE NG/ML

## 2024-08-26 ENCOUNTER — TELEPHONE (OUTPATIENT)
Dept: MEDICAL GROUP | Facility: MEDICAL CENTER | Age: 79
End: 2024-08-26
Payer: MEDICARE

## 2024-08-26 NOTE — TELEPHONE ENCOUNTER
----- Message from Physician Assistant Andrés Ballard P.A.-C. sent at 8/20/2024  1:00 PM PDT -----  Please contact Bill.  CT results are elevated.  I would like to refer him to cardiology.  Is that agreeable with him?  Thank you.    Andrés

## 2024-08-26 NOTE — TELEPHONE ENCOUNTER
Phone Number Called: [phone]    Call outcome: Spoke to patient regarding message below.    Message: Pt was contacted in regards to CT results. Pt is okay with a referral to cardiology.

## 2024-09-03 ENCOUNTER — PATIENT MESSAGE (OUTPATIENT)
Dept: UROLOGY | Facility: MEDICAL CENTER | Age: 79
End: 2024-09-03

## 2024-09-03 ENCOUNTER — OFFICE VISIT (OUTPATIENT)
Dept: ENDOCRINOLOGY | Facility: MEDICAL CENTER | Age: 79
End: 2024-09-03
Attending: INTERNAL MEDICINE
Payer: MEDICARE

## 2024-09-03 VITALS
HEIGHT: 72 IN | HEART RATE: 78 BPM | WEIGHT: 129 LBS | SYSTOLIC BLOOD PRESSURE: 145 MMHG | DIASTOLIC BLOOD PRESSURE: 80 MMHG | BODY MASS INDEX: 17.47 KG/M2 | OXYGEN SATURATION: 96 %

## 2024-09-03 DIAGNOSIS — R53.82 CHRONIC FATIGUE: ICD-10-CM

## 2024-09-03 DIAGNOSIS — I25.84 CORONARY ARTERY CALCIFICATION: ICD-10-CM

## 2024-09-03 DIAGNOSIS — R79.89 LOW TESTOSTERONE IN MALE: ICD-10-CM

## 2024-09-03 DIAGNOSIS — I25.10 CORONARY ARTERY CALCIFICATION: ICD-10-CM

## 2024-09-03 DIAGNOSIS — N39.41 URGENCY INCONTINENCE: ICD-10-CM

## 2024-09-03 DIAGNOSIS — R63.4 WEIGHT LOSS, ABNORMAL: ICD-10-CM

## 2024-09-03 PROCEDURE — 3079F DIAST BP 80-89 MM HG: CPT | Performed by: INTERNAL MEDICINE

## 2024-09-03 PROCEDURE — 99211 OFF/OP EST MAY X REQ PHY/QHP: CPT | Performed by: INTERNAL MEDICINE

## 2024-09-03 PROCEDURE — 3077F SYST BP >= 140 MM HG: CPT | Performed by: INTERNAL MEDICINE

## 2024-09-03 PROCEDURE — 99204 OFFICE O/P NEW MOD 45 MIN: CPT | Performed by: INTERNAL MEDICINE

## 2024-09-03 ASSESSMENT — FIBROSIS 4 INDEX: FIB4 SCORE: 1.39

## 2024-09-03 NOTE — PROGRESS NOTES
New Patient Note for Endocrinology    Referred by: Andrés Ballard P.A.-C.    Clem Soto is a 79 y.o. 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    HPI:  Patient is an 79-year-old male referred for abnormal testosterone lab results, chronic fatigue (likely secondary to long COVID), and weight loss, with an additional complication of elevated coronary artery artery calcification score (>90% for his age group).  Pt has been having fatigue and wt loss since his infection with COVID in 4/23.  His urologist ran the following labs:  6/24 FSH/LH 32.2/11.5, SHBG 90, percent free T1.0, total testosterone 618, testosterone bioavailable 158, prolactin 5.89, tsh 2.13    Patient has noted persistent weight loss since having long COVID, though he has always been thin.  Since his COVID infection he reports that he has not had much of an appetite for anything though he has been trying to maintain his caloric intake.  He is trying to maintain his strength through regular exercise (since he has always been very active), but he has significant postexertional malaise which makes it difficult for him to exercise the way he used to.  This has been complicated by his elevated coronary artery calcium score and his A1c of 5.6%.  On the one hand he has been advised to stay away from foods that might worsen his lipid panel (animal products), and on the other he has been told to avoid carbohydrates to keep his A1c low.  In both cases, this affects his ability to take in enough calories to maintain his weight.  He has not yet seen the dietitian.  He has an appointment with cardiology in 2 days.      ROS:  Per HPI, otherwise: Negative  General: Denies fever/chills, weight loss, recent illness, pt has fatigue and weakness (from weight  loss)  Skin:  Denies rashes, lesions  HEENT: Denies sore throat  Resp:  Denies SOB, dypsnea on exertion  CV:  Denies chest pain, palpitations, diaphoresis  GI:  Wt loss  :  Denies dysuria, urgency, frequency, hematuria  MS:  Denies other joint pain, myalgias  Neuro:  Denies dizziness, balance problems, numbness/tingling  Rheum: Denies polyarthralgias, history of arthritis or gout  Heme:  Denies anemia, easy bruising  Endo:  Denies osteoporosis    Past Medical History:  Patient Active Problem List    Diagnosis Date Noted    Agatston coronary artery calcium score greater than 400 08/20/2024    Ganglion cyst of wrist, right 08/16/2024    Right inguinal hernia 08/16/2024    Weight loss 08/16/2024    Abnormal CT scan of lung 08/16/2024    Chronic nausea 06/21/2024    Generalized OA 05/17/2024    Other hyperlipidemia 05/17/2024    Benign prostatic hyperplasia with nocturia 05/17/2024    Dry eye syndrome of both eyes 05/17/2024    MVP (mitral valve prolapse) 05/17/2024    Age-related osteoporosis without current pathological fracture 05/17/2024    Irritable bowel syndrome with diarrhea 05/17/2024    Chronic fatigue 05/17/2024       Past Surgical History:  Past Surgical History:   Procedure Laterality Date    LAMINOTOMY         Allergies:  Patient has no known allergies.    Social History:  Social History     Socioeconomic History    Marital status:      Spouse name: Not on file    Number of children: Not on file    Years of education: Not on file    Highest education level: Not on file   Occupational History    Not on file   Tobacco Use    Smoking status: Never    Smokeless tobacco: Never   Vaping Use    Vaping status: Never Used   Substance and Sexual Activity    Alcohol use: Yes     Comment: 2-3 beers weekly    Drug use: Not Currently    Sexual activity: Not on file     Comment: , 1 daughter, no grands   Other Topics Concern    Not on file   Social History Narrative    Not on file     Social Determinants  of Health     Financial Resource Strain: Low Risk  (11/12/2023)    Received from [facility]    Overall Financial Resource Strain (CARDIA)     Difficulty of Paying Living Expenses: Not hard at all   Food Insecurity: No Food Insecurity (11/12/2023)    Received from [facility]    Hunger Vital Sign     Worried About Running Out of Food in the Last Year: Never true     Ran Out of Food in the Last Year: Never true   Transportation Needs: No Transportation Needs (11/12/2023)    Received from [facility]    PRAPARE - Transportation     Lack of Transportation (Medical): No     Lack of Transportation (Non-Medical): No   Physical Activity: Insufficiently Active (11/12/2023)    Received from [facility]    Exercise Vital Sign     Days of Exercise per Week: 3 days     Minutes of Exercise per Session: 30 min   Stress: No Stress Concern Present (11/12/2023)    Received from [facility]    [facility] of Occupational Health - Occupational Stress Questionnaire     Feeling of Stress : Not at all   Social Connections: Moderately Integrated (11/12/2023)    Received from [facility]    Social Connection and Isolation Panel [NHANES]     Frequency of Communication with Friends and Family: Twice a week     Frequency of Social Gatherings with Friends and Family: Once a week     Attends Ep[employer] Services: Never     Active Member of Clubs or Organizations: No     Attends Club or Organization Meetings: 1 to 4 times per year     Marital Status:    Intimate Partner Violence: Not At Risk (11/12/2023)    Received from [facility]    Humiliation, Afraid, Rape, and Kick questionnaire     Fear of Current or Ex-Partner: No     Emotionally Abused: No     Physically Abused: No     Sexually Abused: No   Housing Stability: Low Risk  (11/12/2023)    Received from [facility]    Housing Stability Vital Sign     Unable to Pay for Housing in  the Last Year: No     Number of Places Lived in the Last Year: 1     Unstable Housing in the Last Year: No       Family History:  History reviewed. No pertinent family history.    Medications:    Current Outpatient Medications:     trospium (SANCTURA) 20 MG Tab, Take 1 Tablet by mouth at bedtime., Disp: 30 Tablet, Rfl: 2    famotidine (PEPCID) 40 MG Tab, Take 40 mg by mouth every day., Disp: , Rfl:     atorvastatin (LIPITOR) 10 MG Tab, Take 1 Tablet by mouth every day for 360 days., Disp: 90 Tablet, Rfl: 3    cholestyramine (QUESTRAN) 4 g packet, Take 1 Packet by mouth every day., Disp: , Rfl:     cycloSPORINE (RESTASIS) 0.05 % ophthalmic emulsion, Administer 1 Drop into both eyes 2 times a day., Disp: , Rfl:     finasteride (PROSCAR) 5 MG Tab, Take 1 Tablet by mouth every day., Disp: 90 Tablet, Rfl: 3    Physical Examination:   Vital signs: BP (!) 145/80 (BP Location: Left arm, Patient Position: Sitting, BP Cuff Size: Adult)   Pulse 78   Ht 1.829 m (6')   Wt 58.5 kg (129 lb)   SpO2 96%   BMI 17.50 kg/m²   General: Thin appearing, No distress, cooperative, well dressed and well nourished.   Eyes: No scleral icterus or discharge  ENMT: Normal on external inspection of nose, lips, No nasal drainage   Neck: No abnormal masses on inspection  Resp: Normal effort  Psych: Normal mood and affect      Assessment and Plan:    1. Chronic fatigue  Most likely secondary to long COVID.  I advised continued exercise though he will need to balance this with his postexertional malaise.    2. 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  It should be noted that his FSH and LH are both elevated suggesting that his hypothalamic pituitary reproductive axis senses that his testosterone is low.  Also his SHBG is elevated at 90, which may be a result of the patient's decreased weight.  The patient and I discussed the possibility of starting on something like  Clomid which may increase his testosterone levels and decrease his fatigue if he was still having trouble in 1 to 2 months, though I would say there is no clear indication for testosterone supplementation at this time.    3. Coronary artery calcification  Patient to follow-up with cardiology in 2 days.    4. Weight loss, abnormal  Patient is following with primary care.  He has already seen GI and is also seeing urology.  This may be simply related to his decreased appetite and fatigue since his COVID infection, but I do worry about a possible occult malignancy, which I discussed with the patient.        Ac Agarwal M.D.

## 2024-09-04 ENCOUNTER — TELEPHONE (OUTPATIENT)
Dept: CARDIOLOGY | Facility: MEDICAL CENTER | Age: 79
End: 2024-09-04
Payer: MEDICARE

## 2024-09-04 NOTE — TELEPHONE ENCOUNTER
Spoke to patient in regards to obtaining records for NP appointment with . Per patient has never been treated by a previous cardiology.

## 2024-09-05 ENCOUNTER — OFFICE VISIT (OUTPATIENT)
Dept: CARDIOLOGY | Facility: MEDICAL CENTER | Age: 79
End: 2024-09-05
Attending: PHYSICIAN ASSISTANT
Payer: MEDICARE

## 2024-09-05 VITALS
HEART RATE: 88 BPM | DIASTOLIC BLOOD PRESSURE: 68 MMHG | OXYGEN SATURATION: 94 % | BODY MASS INDEX: 17.96 KG/M2 | WEIGHT: 132.6 LBS | RESPIRATION RATE: 16 BRPM | HEIGHT: 72 IN | SYSTOLIC BLOOD PRESSURE: 100 MMHG

## 2024-09-05 DIAGNOSIS — I25.10 CORONARY ARTERY DISEASE INVOLVING NATIVE CORONARY ARTERY OF NATIVE HEART WITHOUT ANGINA PECTORIS: ICD-10-CM

## 2024-09-05 DIAGNOSIS — R93.1 AGATSTON CORONARY ARTERY CALCIUM SCORE GREATER THAN 400: ICD-10-CM

## 2024-09-05 DIAGNOSIS — E78.49 OTHER HYPERLIPIDEMIA: ICD-10-CM

## 2024-09-05 DIAGNOSIS — R09.89 BRUIT: ICD-10-CM

## 2024-09-05 DIAGNOSIS — R93.1 ELEVATED CORONARY ARTERY CALCIUM SCORE: ICD-10-CM

## 2024-09-05 DIAGNOSIS — E78.00 PURE HYPERCHOLESTEROLEMIA: ICD-10-CM

## 2024-09-05 LAB — EKG IMPRESSION: NORMAL

## 2024-09-05 PROCEDURE — 93005 ELECTROCARDIOGRAM TRACING: CPT | Performed by: INTERNAL MEDICINE

## 2024-09-05 PROCEDURE — 93010 ELECTROCARDIOGRAM REPORT: CPT | Performed by: INTERNAL MEDICINE

## 2024-09-05 PROCEDURE — 99204 OFFICE O/P NEW MOD 45 MIN: CPT | Performed by: INTERNAL MEDICINE

## 2024-09-05 PROCEDURE — 99212 OFFICE O/P EST SF 10 MIN: CPT | Performed by: INTERNAL MEDICINE

## 2024-09-05 PROCEDURE — G2211 COMPLEX E/M VISIT ADD ON: HCPCS | Performed by: INTERNAL MEDICINE

## 2024-09-05 PROCEDURE — 3078F DIAST BP <80 MM HG: CPT | Performed by: INTERNAL MEDICINE

## 2024-09-05 PROCEDURE — 3074F SYST BP LT 130 MM HG: CPT | Performed by: INTERNAL MEDICINE

## 2024-09-05 RX ORDER — ATORVASTATIN CALCIUM 40 MG/1
40 TABLET, FILM COATED ORAL NIGHTLY
Qty: 100 TABLET | Refills: 4 | Status: SHIPPED | OUTPATIENT
Start: 2024-09-05

## 2024-09-05 ASSESSMENT — ENCOUNTER SYMPTOMS
SPEECH CHANGE: 0
FALLS: 0
BLURRED VISION: 0
VOMITING: 0
FEVER: 0
HALLUCINATIONS: 0
EYE PAIN: 0
WEIGHT LOSS: 0
DEPRESSION: 0
CHILLS: 0
BRUISES/BLEEDS EASILY: 0
DOUBLE VISION: 0
COUGH: 0
SENSORY CHANGE: 0
HEADACHES: 0
ORTHOPNEA: 0
SHORTNESS OF BREATH: 0
EYE DISCHARGE: 0
DIZZINESS: 0
LOSS OF CONSCIOUSNESS: 0
BLOOD IN STOOL: 0
CLAUDICATION: 0
ABDOMINAL PAIN: 0
PND: 0
MYALGIAS: 0
NAUSEA: 0
PALPITATIONS: 0

## 2024-09-05 ASSESSMENT — FIBROSIS 4 INDEX: FIB4 SCORE: 1.39

## 2024-09-05 NOTE — PROGRESS NOTES
Chief Complaint   Patient presents with    Hyperlipidemia       Subjective     Pérez Soto is an 79 y.o. male who presents today due to elevated coronary Ca score (2607), HLP.    + chest pain. Patient has chest pain at sporadic times. No specific precipitating factors or worsening factors. Chest pain is described to be pressure-like sensation however localized at anterior chest without radition. Lasting for seconds to minutes. No prior cardiac problems. No prior cardiac workup.      I have personally interpreted EKG today with patient, there is no evidence of acute coronary syndrome, no evidence of prior infarct, normal SC and QT interval, no significant conduction disease. Sinus rhythm.    Clem Soto does not have any history of heart attack arrhythmias in the past. he never had transthoracic echocardiogram, cardiac catheterization or ablations procedure in the past.     No family history of sudden cardiac death.    I have independently interpreted and reviewed blood tests results with patient in clinic which shows LDL level of 87, triglycerides level of 68, GFR of 96, K of 4.      No past medical history on file.  Past Surgical History:   Procedure Laterality Date    LAMINOTOMY       No family history on file.  Social History     Socioeconomic History    Marital status:      Spouse name: Not on file    Number of children: Not on file    Years of education: Not on file    Highest education level: Not on file   Occupational History    Not on file   Tobacco Use    Smoking status: Never    Smokeless tobacco: Never   Vaping Use    Vaping status: Never Used   Substance and Sexual Activity    Alcohol use: Yes     Comment: 2-3 beers weekly    Drug use: Not Currently    Sexual activity: Not on file     Comment: , 1 daughter, no grands   Other Topics Concern    Not on file   Social History Narrative    Not on file     Social Determinants of Health     Financial Resource Strain: Low Risk   (11/12/2023)    Received from [facility]    Overall Financial Resource Strain (CARDIA)     Difficulty of Paying Living Expenses: Not hard at all   Food Insecurity: No Food Insecurity (11/12/2023)    Received from [facility]    Hunger Vital Sign     Worried About Running Out of Food in the Last Year: Never true     Ran Out of Food in the Last Year: Never true   Transportation Needs: No Transportation Needs (11/12/2023)    Received from [facility]    PRAPARE - Transportation     Lack of Transportation (Medical): No     Lack of Transportation (Non-Medical): No   Physical Activity: Insufficiently Active (11/12/2023)    Received from [facility]    Exercise Vital Sign     Days of Exercise per Week: 3 days     Minutes of Exercise per Session: 30 min   Stress: No Stress Concern Present (11/12/2023)    Received from [facility]    [employer] [place] of Occupational Health - Occupational Stress Questionnaire     Feeling of Stress : Not at all   Social Connections: Moderately Integrated (11/12/2023)    Received from [facility]    Social Connection and Isolation Panel [NHANES]     Frequency of Communication with Friends and Family: Twice a week     Frequency of Social Gatherings with Friends and Family: Once a week     Attends Mu-ism Services: Never     Active Member of Clubs or Organizations: No     Attends Club or Organization Meetings: 1 to 4 times per year     Marital Status:    Intimate Partner Violence: Not At Risk (11/12/2023)    Received from [facility]    Humiliation, Afraid, Rape, and Kick questionnaire     Fear of Current or Ex-Partner: No     Emotionally Abused: No     Physically Abused: No     Sexually Abused: No   Housing Stability: Low Risk  (11/12/2023)    Received from [facility]    Housing Stability Vital Sign     Unable to Pay for Housing in the Last Year: No     Number of Places Lived in  the Last Year: 1     Unstable Housing in the Last Year: No     No Known Allergies  Outpatient Encounter Medications as of 9/5/2024   Medication Sig Dispense Refill    atorvastatin (LIPITOR) 40 MG Tab Take 1 Tablet by mouth every evening. 100 Tablet 4    trospium (SANCTURA) 20 MG Tab Take 1 Tablet by mouth at bedtime. 30 Tablet 2    finasteride (PROSCAR) 5 MG Tab Take 1 Tablet by mouth every day. 90 Tablet 3    famotidine (PEPCID) 40 MG Tab Take 40 mg by mouth every day.      cholestyramine (QUESTRAN) 4 g packet Take 1 Packet by mouth every day.      cycloSPORINE (RESTASIS) 0.05 % ophthalmic emulsion Administer 1 Drop into both eyes 2 times a day.      [DISCONTINUED] atorvastatin (LIPITOR) 10 MG Tab Take 1 Tablet by mouth every day for 360 days. 90 Tablet 3     No facility-administered encounter medications on file as of 9/5/2024.     Review of Systems   Constitutional:  Negative for chills, fever, malaise/fatigue and weight loss.   HENT:  Negative for ear discharge, ear pain, hearing loss and nosebleeds.    Eyes:  Negative for blurred vision, double vision, pain and discharge.   Respiratory:  Negative for cough and shortness of breath.    Cardiovascular:  Negative for chest pain, palpitations, orthopnea, claudication, leg swelling and PND.   Gastrointestinal:  Negative for abdominal pain, blood in stool, melena, nausea and vomiting.   Genitourinary:  Negative for dysuria and hematuria.   Musculoskeletal:  Negative for falls, joint pain and myalgias.   Skin:  Negative for itching and rash.   Neurological:  Negative for dizziness, sensory change, speech change, loss of consciousness and headaches.   Endo/Heme/Allergies:  Negative for environmental allergies. Does not bruise/bleed easily.   Psychiatric/Behavioral:  Negative for depression, hallucinations and suicidal ideas.               Objective     /68 (BP Location: Left arm, Patient Position: Sitting, BP Cuff Size: Adult)   Pulse 88   Resp 16   Ht 1.829 m  (6')   Wt 60.1 kg (132 lb 9.6 oz)   SpO2 94%   BMI 17.98 kg/m²     Physical Exam  Vitals and nursing note reviewed.   Constitutional:       General: He is not in acute distress.     Appearance: He is not diaphoretic.   HENT:      Head: Normocephalic and atraumatic.      Right Ear: External ear normal.      Left Ear: External ear normal.      Nose: No congestion or rhinorrhea.   Eyes:      General:         Right eye: No discharge.         Left eye: No discharge.   Neck:      Thyroid: No thyromegaly.      Vascular: Carotid bruit present. No JVD.   Cardiovascular:      Rate and Rhythm: Normal rate and regular rhythm.      Pulses: Normal pulses.   Pulmonary:      Effort: No respiratory distress.   Abdominal:      General: There is no distension.      Tenderness: There is no abdominal tenderness.   Musculoskeletal:         General: No swelling or tenderness.      Right lower leg: No edema.      Left lower leg: No edema.   Skin:     General: Skin is warm and dry.   Neurological:      Mental Status: He is alert and oriented to person, place, and time.      Cranial Nerves: No cranial nerve deficit.   Psychiatric:         Behavior: Behavior normal.                Assessment & Plan     1. Elevated coronary artery calcium score  atorvastatin (LIPITOR) 40 MG Tab    EC-ECHOCARDIOGRAM COMPLETE W/O CONT    NM-CARDIAC STRESS TEST    US-ABDOMINAL AORTA SCREENING (AAA)    US-CAROTID DOPPLER BILAT      2. Coronary artery disease involving native coronary artery of native heart without angina pectoris  atorvastatin (LIPITOR) 40 MG Tab    EC-ECHOCARDIOGRAM COMPLETE W/O CONT    NM-CARDIAC STRESS TEST    US-ABDOMINAL AORTA SCREENING (AAA)    US-CAROTID DOPPLER BILAT      3. Pure hypercholesterolemia  EKG      4. Agatston coronary artery calcium score greater than 400 [R93.1]  atorvastatin (LIPITOR) 40 MG Tab    US-ABDOMINAL AORTA SCREENING (AAA)      5. Other hyperlipidemia [E78.49]        6. Bruit  US-CAROTID DOPPLER BILAT           Medical Decision Making: Today's Assessment/Status/Plan:   At this time, to further risk stratify, I will order a transthoracic echocardiogram to assess cardiac and valvular functions. I will also order a myocardial nuclear scan stress test to assess for coronary ischemia.    Will increase Atorvastatin to 40 mg daily.    This visit encounter signifies the visit complexity inherent to evaluation and management associated with medical care services that serve as the continuing focal point for all needed health care services and/or with medical care services that are part of ongoing care related to this patient's single, serious condition, complex cardiac condition.    Thomas Khan M.D.

## 2024-09-09 ENCOUNTER — HOSPITAL ENCOUNTER (OUTPATIENT)
Facility: MEDICAL CENTER | Age: 79
End: 2024-09-09
Attending: PHYSICIAN ASSISTANT
Payer: MEDICARE

## 2024-09-09 ENCOUNTER — APPOINTMENT (OUTPATIENT)
Dept: LAB | Facility: MEDICAL CENTER | Age: 79
End: 2024-09-09
Attending: PHYSICIAN ASSISTANT
Payer: MEDICARE

## 2024-09-09 DIAGNOSIS — N39.41 URGENCY INCONTINENCE: ICD-10-CM

## 2024-09-09 LAB
APPEARANCE UR: CLEAR
BACTERIA #/AREA URNS HPF: NEGATIVE /HPF
BILIRUB UR QL STRIP.AUTO: NEGATIVE
COLOR UR: YELLOW
EPI CELLS #/AREA URNS HPF: NEGATIVE /HPF
GLUCOSE UR STRIP.AUTO-MCNC: NEGATIVE MG/DL
KETONES UR STRIP.AUTO-MCNC: NEGATIVE MG/DL
LEUKOCYTE ESTERASE UR QL STRIP.AUTO: NEGATIVE
MICRO URNS: ABNORMAL
NITRITE UR QL STRIP.AUTO: NEGATIVE
PH UR STRIP.AUTO: 6 [PH] (ref 5–8)
PROT UR QL STRIP: NEGATIVE MG/DL
RBC # URNS HPF: ABNORMAL /HPF
RBC UR QL AUTO: ABNORMAL
SP GR UR STRIP.AUTO: 1.01
WBC #/AREA URNS HPF: ABNORMAL /HPF

## 2024-09-09 PROCEDURE — 81001 URINALYSIS AUTO W/SCOPE: CPT

## 2024-09-10 NOTE — PATIENT COMMUNICATION
Received request via: Patient    Was the patient seen in the last year in this department? Yes    Does the patient have an active prescription (recently filled or refills available) for medication(s) requested? No    [facility]    Does the patient have [facility] Plus and need 100-day supply? (This applies to ALL medications) Patient does not have SCP

## 2024-09-11 RX ORDER — TRAMADOL HYDROCHLORIDE 50 MG/1
50 TABLET ORAL EVERY 8 HOURS PRN
Qty: 90 TABLET | Refills: 0 | Status: SHIPPED | OUTPATIENT
Start: 2024-09-11 | End: 2024-10-11

## 2024-09-23 ENCOUNTER — APPOINTMENT (OUTPATIENT)
Dept: RADIOLOGY | Facility: MEDICAL CENTER | Age: 79
End: 2024-09-23
Attending: INTERNAL MEDICINE
Payer: MEDICARE

## 2024-09-25 ENCOUNTER — TELEPHONE (OUTPATIENT)
Dept: CARDIOLOGY | Facility: MEDICAL CENTER | Age: 79
End: 2024-09-25
Payer: MEDICARE

## 2024-09-25 DIAGNOSIS — I25.10 CORONARY ARTERY DISEASE INVOLVING NATIVE CORONARY ARTERY OF NATIVE HEART WITHOUT ANGINA PECTORIS: ICD-10-CM

## 2024-09-25 DIAGNOSIS — R93.1 ELEVATED CORONARY ARTERY CALCIUM SCORE: ICD-10-CM

## 2024-09-25 NOTE — TELEPHONE ENCOUNTER
TT    Caller: Clem Soto     Topic/issue:Patient says he is experiencing long COVID symptoms for the past 16 months and is requesting a chemical injection stress test instead of a treadmill stress test.      Callback Number: [phone]     Thank you,  Brennan STRANGE

## 2024-09-25 NOTE — TELEPHONE ENCOUNTER
CANDELARIA Gray1 minute ago (4:42 PM)     y   ----------------------------------------------------------------    Order placed, [employer] message sent to jayla

## 2024-09-27 DIAGNOSIS — R63.4 WEIGHT LOSS: ICD-10-CM

## 2024-09-30 ENCOUNTER — HOSPITAL ENCOUNTER (OUTPATIENT)
Dept: RADIOLOGY | Facility: MEDICAL CENTER | Age: 79
End: 2024-09-30
Attending: INTERNAL MEDICINE
Payer: MEDICARE

## 2024-09-30 DIAGNOSIS — I25.10 CORONARY ARTERY DISEASE INVOLVING NATIVE CORONARY ARTERY OF NATIVE HEART WITHOUT ANGINA PECTORIS: ICD-10-CM

## 2024-09-30 DIAGNOSIS — R93.1 ELEVATED CORONARY ARTERY CALCIUM SCORE: ICD-10-CM

## 2024-09-30 PROCEDURE — 93018 CV STRESS TEST I&R ONLY: CPT | Performed by: INTERNAL MEDICINE

## 2024-09-30 PROCEDURE — A9502 TC99M TETROFOSMIN: HCPCS

## 2024-09-30 PROCEDURE — 78452 HT MUSCLE IMAGE SPECT MULT: CPT | Mod: 26 | Performed by: INTERNAL MEDICINE

## 2024-09-30 PROCEDURE — 700111 HCHG RX REV CODE 636 W/ 250 OVERRIDE (IP): Performed by: INTERNAL MEDICINE

## 2024-09-30 RX ORDER — AMINOPHYLLINE 25 MG/ML
100 INJECTION, SOLUTION INTRAVENOUS
Status: DISCONTINUED | OUTPATIENT
Start: 2024-09-30 | End: 2024-10-01 | Stop reason: HOSPADM

## 2024-09-30 RX ORDER — REGADENOSON 0.08 MG/ML
0.4 INJECTION, SOLUTION INTRAVENOUS ONCE
Status: COMPLETED | OUTPATIENT
Start: 2024-09-30 | End: 2024-09-30

## 2024-09-30 RX ADMIN — REGADENOSON 0.4 MG: 0.08 INJECTION, SOLUTION INTRAVENOUS at 10:09

## 2024-10-01 ENCOUNTER — APPOINTMENT (OUTPATIENT)
Dept: UROLOGY | Facility: MEDICAL CENTER | Age: 79
End: 2024-10-01
Payer: MEDICARE

## 2024-10-01 DIAGNOSIS — R53.82 CHRONIC FATIGUE: ICD-10-CM

## 2024-10-01 DIAGNOSIS — R35.1 BENIGN PROSTATIC HYPERPLASIA WITH NOCTURIA: Primary | Chronic | ICD-10-CM

## 2024-10-01 DIAGNOSIS — N40.1 BENIGN PROSTATIC HYPERPLASIA WITH NOCTURIA: Primary | Chronic | ICD-10-CM

## 2024-10-01 DIAGNOSIS — N39.41 URGENCY INCONTINENCE: ICD-10-CM

## 2024-10-01 PROCEDURE — 99215 OFFICE O/P EST HI 40 MIN: CPT | Performed by: PHYSICIAN ASSISTANT

## 2024-10-02 DIAGNOSIS — R63.4 WEIGHT LOSS: ICD-10-CM

## 2024-10-10 ENCOUNTER — TELEPHONE (OUTPATIENT)
Dept: MEDICAL GROUP | Facility: MEDICAL CENTER | Age: 79
End: 2024-10-10
Payer: MEDICARE

## 2024-10-15 ENCOUNTER — HOSPITAL ENCOUNTER (OUTPATIENT)
Facility: MEDICAL CENTER | Age: 79
End: 2024-10-15
Payer: MEDICARE

## 2024-10-15 LAB — HEMOCCULT STL QL: NEGATIVE

## 2024-10-15 PROCEDURE — 82272 OCCULT BLD FECES 1-3 TESTS: CPT

## 2024-10-15 PROCEDURE — 83993 ASSAY FOR CALPROTECTIN FECAL: CPT

## 2024-10-16 LAB — CALPROTECTIN STL-MCNT: 28 UG/G

## 2024-10-16 RX ORDER — MEGESTROL ACETATE 40 MG/ML
200 SUSPENSION ORAL DAILY
Status: CANCELLED | OUTPATIENT
Start: 2024-10-16

## 2024-10-18 DIAGNOSIS — R63.4 WEIGHT LOSS: ICD-10-CM

## 2024-10-18 RX ORDER — MEGESTROL ACETATE 40 MG/ML
200 SUSPENSION ORAL DAILY
Qty: 240 ML | Refills: 1 | Status: SHIPPED | OUTPATIENT
Start: 2024-10-18 | End: 2025-01-22

## 2024-10-29 ENCOUNTER — APPOINTMENT (OUTPATIENT)
Dept: RADIOLOGY | Facility: MEDICAL CENTER | Age: 79
End: 2024-10-29
Attending: INTERNAL MEDICINE
Payer: MEDICARE

## 2024-10-29 DIAGNOSIS — I25.10 CORONARY ARTERY DISEASE INVOLVING NATIVE CORONARY ARTERY OF NATIVE HEART WITHOUT ANGINA PECTORIS: ICD-10-CM

## 2024-10-29 DIAGNOSIS — R93.1 AGATSTON CORONARY ARTERY CALCIUM SCORE GREATER THAN 400: ICD-10-CM

## 2024-10-29 DIAGNOSIS — R09.89 BRUIT: ICD-10-CM

## 2024-10-29 DIAGNOSIS — R93.1 ELEVATED CORONARY ARTERY CALCIUM SCORE: ICD-10-CM

## 2024-10-29 PROCEDURE — 93880 EXTRACRANIAL BILAT STUDY: CPT

## 2024-10-29 PROCEDURE — 76706 US ABDL AORTA SCREEN AAA: CPT

## 2024-10-30 ENCOUNTER — HOSPITAL ENCOUNTER (OUTPATIENT)
Dept: CARDIOLOGY | Facility: MEDICAL CENTER | Age: 79
End: 2024-10-30
Attending: INTERNAL MEDICINE
Payer: MEDICARE

## 2024-10-30 DIAGNOSIS — R93.1 ELEVATED CORONARY ARTERY CALCIUM SCORE: ICD-10-CM

## 2024-10-30 DIAGNOSIS — I25.10 CORONARY ARTERY DISEASE INVOLVING NATIVE CORONARY ARTERY OF NATIVE HEART WITHOUT ANGINA PECTORIS: ICD-10-CM

## 2024-10-30 LAB
LV EJECT FRACT  99904: 65
LV EJECT FRACT MOD 2C 99903: 72.97
LV EJECT FRACT MOD 4C 99902: 57.79
LV EJECT FRACT MOD BP 99901: 63.26

## 2024-10-30 PROCEDURE — 93306 TTE W/DOPPLER COMPLETE: CPT

## 2024-11-01 ENCOUNTER — TELEPHONE (OUTPATIENT)
Dept: CARDIOLOGY | Facility: MEDICAL CENTER | Age: 79
End: 2024-11-01
Payer: MEDICARE

## 2024-11-01 NOTE — TELEPHONE ENCOUNTER
----- Message from Physician Thomas Khan M.D. sent at 10/30/2024  4:55 PM PDT -----  Dear Liliana,    Can you please let Clem Soto know that result is not entirely normal and I will see patient sooner than scheduled to discuss?    Thank you,  Dmitri.

## 2024-11-04 ENCOUNTER — PROCEDURE VISIT (OUTPATIENT)
Dept: UROLOGY | Facility: MEDICAL CENTER | Age: 79
End: 2024-11-04
Payer: MEDICARE

## 2024-11-04 DIAGNOSIS — R35.1 BENIGN PROSTATIC HYPERPLASIA WITH NOCTURIA: ICD-10-CM

## 2024-11-04 DIAGNOSIS — N40.1 BENIGN PROSTATIC HYPERPLASIA WITH NOCTURIA: ICD-10-CM

## 2024-11-04 LAB
APPEARANCE UR: CLEAR
BILIRUB UR STRIP-MCNC: NORMAL MG/DL
COLOR UR AUTO: NORMAL
GLUCOSE UR STRIP.AUTO-MCNC: NORMAL MG/DL
KETONES UR STRIP.AUTO-MCNC: NORMAL MG/DL
LEUKOCYTE ESTERASE UR QL STRIP.AUTO: NORMAL
NITRITE UR QL STRIP.AUTO: NORMAL
PH UR STRIP.AUTO: 5.5 [PH] (ref 5–8)
PROT UR QL STRIP: NORMAL MG/DL
RBC UR QL AUTO: NORMAL
SP GR UR STRIP.AUTO: 1.02
UROBILINOGEN UR STRIP-MCNC: 0.2 MG/DL

## 2024-11-04 PROCEDURE — 52000 CYSTOURETHROSCOPY: CPT | Performed by: STUDENT IN AN ORGANIZED HEALTH CARE EDUCATION/TRAINING PROGRAM

## 2024-11-04 PROCEDURE — 99214 OFFICE O/P EST MOD 30 MIN: CPT | Mod: 25 | Performed by: STUDENT IN AN ORGANIZED HEALTH CARE EDUCATION/TRAINING PROGRAM

## 2024-11-04 PROCEDURE — 81002 URINALYSIS NONAUTO W/O SCOPE: CPT | Performed by: STUDENT IN AN ORGANIZED HEALTH CARE EDUCATION/TRAINING PROGRAM

## 2024-11-04 RX ORDER — TADALAFIL 5 MG/1
5 TABLET ORAL DAILY
Qty: 90 TABLET | Refills: 3 | Status: SHIPPED | OUTPATIENT
Start: 2024-11-04 | End: 2025-10-30

## 2024-11-04 NOTE — PROGRESS NOTES
Subjective  Clem Soto is a 79 y.o. male who presents today for cystoscopy to evaluate BPH. He has had continued nocturia 3-4x per night with some urgency during the daytime. He says his stream is stronger during the day and weaker overnight.     History reviewed. No pertinent family history.    Social History     Socioeconomic History    Marital status:      Spouse name: Not on file    Number of children: Not on file    Years of education: Not on file    Highest education level: Not on file   Occupational History    Not on file   Tobacco Use    Smoking status: Never    Smokeless tobacco: Never   Vaping Use    Vaping status: Never Used   Substance and Sexual Activity    Alcohol use: Yes     Comment: 2-3 beers weekly    Drug use: Not Currently    Sexual activity: Not on file     Comment: , 1 daughter, no grands   Other Topics Concern    Not on file   Social History Narrative    Not on file     Social Drivers of Health     Financial Resource Strain: Low Risk  (11/12/2023)    Received from [facility]    Overall Financial Resource Strain (CARDIA)     Difficulty of Paying Living Expenses: Not hard at all   Food Insecurity: No Food Insecurity (11/12/2023)    Received from [facility]    Hunger Vital Sign     Worried About Running Out of Food in the Last Year: Never true     Ran Out of Food in the Last Year: Never true   Transportation Needs: No Transportation Needs (11/12/2023)    Received from [facility]    PRAPARE - Transportation     Lack of Transportation (Medical): No     Lack of Transportation (Non-Medical): No   Physical Activity: Insufficiently Active (11/12/2023)    Received from [facility]    Exercise Vital Sign     Days of Exercise per Week: 3 days     Minutes of Exercise per Session: 30 min   Stress: No Stress Concern Present (11/12/2023)    Received from [facility]    [facility] [employer] -  Occupational Stress Questionnaire     Feeling of Stress : Not at all   Social Connections: Moderately Integrated (11/12/2023)    Received from [facility]    Social Connection and Isolation Panel [NHANES]     Frequency of Communication with Friends and Family: Twice a week     Frequency of Social Gatherings with Friends and Family: Once a week     Attends Zoroastrian Services: Never     Active Member of Clubs or Organizations: No     Attends Club or Organization Meetings: 1 to 4 times per year     Marital Status:    Intimate Partner Violence: Not At Risk (11/12/2023)    Received from [facility]    Humiliation, Afraid, Rape, and Kick questionnaire     Fear of Current or Ex-Partner: No     Emotionally Abused: No     Physically Abused: No     Sexually Abused: No   Housing Stability: Low Risk  (11/12/2023)    Received from [facility]    Housing Stability Vital Sign     Unable to Pay for Housing in the Last Year: No     Number of Places Lived in the Last Year: 1     Unstable Housing in the Last Year: No       Past Surgical History:   Procedure Laterality Date    LAMINOTOMY         History reviewed. No pertinent past medical history.    Current Outpatient Medications   Medication Sig Dispense Refill    tadalafil (CIALIS) 5 MG tablet Take 1 Tablet by mouth every day for 360 days. 90 Tablet 3    megestrol (MEGACE) 40 MG/ML Suspension Take 5 mL by mouth every day for 96 days. 240 mL 1    atorvastatin (LIPITOR) 40 MG Tab Take 1 Tablet by mouth every evening. 100 Tablet 4    famotidine (PEPCID) 40 MG Tab Take 40 mg by mouth every day.      cholestyramine (QUESTRAN) 4 g packet Take 1 Packet by mouth every day.      cycloSPORINE (RESTASIS) 0.05 % ophthalmic emulsion Administer 1 Drop into both eyes 2 times a day.      trospium (SANCTURA) 20 MG Tab Take 1 Tablet by mouth at bedtime. (Patient not taking: Reported on 11/4/2024) 30 Tablet 2    finasteride (PROSCAR) 5 MG Tab Take 1 Tablet  by mouth every day. (Patient not taking: Reported on 11/4/2024) 90 Tablet 3     No current facility-administered medications for this visit.       No Known Allergies    Objective  There were no vitals taken for this visit.  Physical Exam  Constitutional:       Appearance: Normal appearance.   HENT:      Head: Normocephalic and atraumatic.   Pulmonary:      Effort: Pulmonary effort is normal.   Skin:     General: Skin is warm and dry.   Neurological:      General: No focal deficit present.      Mental Status: He is alert.   Psychiatric:         Mood and Affect: Mood normal.         Behavior: Behavior normal.         Labs:     POC UA  Lab Results   Component Value Date/Time    POCCOLOR dark yellow 11/04/2024 11:12 AM    POCAPPEAR clear 11/04/2024 11:12 AM    POCLEUKEST neg 11/04/2024 11:12 AM    POCNITRITE neg 11/04/2024 11:12 AM    POCUROBILIGE 0.2 11/04/2024 11:12 AM    POCPROTEIN neg 11/04/2024 11:12 AM    POCURPH 5.5 11/04/2024 11:12 AM    POCBLOOD small 11/04/2024 11:12 AM    POCSPGRV 1.020 11/04/2024 11:12 AM    POCKETONES neg 11/04/2024 11:12 AM    POCBILIRUBIN neg 11/04/2024 11:12 AM    POCGLUCUA neg 11/04/2024 11:12 AM        A1C  Lab Results   Component Value Date/Time    HBA1C 5.6 08/20/2024 1144    AVGLUC 114 08/20/2024 1144       Imaging: none    Procedure    Procedure performed: Cystoscopy       Surgeon: Dr. Cisco Yu    Indications For Procedure: BPH    Blood Loss: 0  cc     Anesthesia: Lidocaine jelly     Specimen: none     Findings:     1. Urethra: no lesions or masses    2. Bladder: no lesions or masses, trabeculations and shallow diverticula throuhgout the bladder moderate    3. Bilateral ureteral jets observed with clear efflux bilaterally     4. Prostate: moderate lateral lobe hypertrophy, moderate median lobe with ball-valve appearance, 2.5 cm prostate length     Description of Procedure: The patient was prepped and draped in the usual sterile fashion.  A 17Fr flexible cystoscope was advanced  along the urethra into the bladder under direct vision.  We performed a thorough cystoscopic evaluation patient's bladder including retroflexion, findings noted above.  We removed the cystoscope under direct vision to evaluate the urethra and prostate, findings noted above.  This concluded the procedure, the patient tolerated it well.     Assessment  Patient was instructed to call our office if he develops any signs of infection including increased frequency, urgency, dysuria, fever, or chills.  We discussed the results of the cystoscopy with the patient, all questions and concerns addressed. He would like to avoid surgery if at all possible. We discussed that with the ball-valve appearance of a part of his median lobe that he is more likely to require sugery. I recommend continuing the Finasteride and adding Tadalafil 5 mg daily. He will follow up in 3 months to re-assess. If his symptoms remain unchanged I would recommend TURP at that time.    Plan    1. Benign prostatic hyperplasia with nocturia  - POCT Urinalysis    Other orders  - tadalafil (CIALIS) 5 MG tablet; Take 1 Tablet by mouth every day for 360 days.  Dispense: 90 Tablet; Refill: 3      RTC 3 months  Continue Finasteride  Start Tadalafil

## 2024-11-05 ENCOUNTER — OFFICE VISIT (OUTPATIENT)
Dept: ENDOCRINOLOGY | Facility: MEDICAL CENTER | Age: 79
End: 2024-11-05
Attending: INTERNAL MEDICINE
Payer: MEDICARE

## 2024-11-05 VITALS
BODY MASS INDEX: 18.15 KG/M2 | SYSTOLIC BLOOD PRESSURE: 129 MMHG | HEART RATE: 96 BPM | DIASTOLIC BLOOD PRESSURE: 75 MMHG | OXYGEN SATURATION: 94 % | WEIGHT: 134 LBS | HEIGHT: 72 IN

## 2024-11-05 DIAGNOSIS — R53.82 CHRONIC FATIGUE: ICD-10-CM

## 2024-11-05 DIAGNOSIS — R63.4 WEIGHT LOSS: ICD-10-CM

## 2024-11-05 PROCEDURE — 3074F SYST BP LT 130 MM HG: CPT | Performed by: INTERNAL MEDICINE

## 2024-11-05 PROCEDURE — 3078F DIAST BP <80 MM HG: CPT | Performed by: INTERNAL MEDICINE

## 2024-11-05 PROCEDURE — 99213 OFFICE O/P EST LOW 20 MIN: CPT | Performed by: INTERNAL MEDICINE

## 2024-11-05 PROCEDURE — 99211 OFF/OP EST MAY X REQ PHY/QHP: CPT | Performed by: INTERNAL MEDICINE

## 2024-11-05 ASSESSMENT — FIBROSIS 4 INDEX: FIB4 SCORE: 1.39

## 2024-11-08 NOTE — PROGRESS NOTES
Established Patient    Patient Care Team:  Andrés Ballard P.A.-C. as PCP - General (Family Medicine)    Clem Soto is a 79 y.o. male who presents today with the following Chief Complaint(s): Follow up for Diagnoses of Chronic fatigue and Weight loss were pertinent to this visit.    HPI:  Patient is an 79-year-old male referred for abnormal testosterone lab results, chronic fatigue (likely secondary to long COVID), and weight loss, with an additional complication of elevated coronary artery artery calcification score (>90% for his age group).  Pt has been having fatigue and wt loss since his infection with COVID in 4/23.  His urologist ran the following labs:  6/24 FSH/LH 32.2/11.5, SHBG 90, percent free T1.0, total testosterone 618, testosterone bioavailable 158, prolactin 5.89, tsh 2.13    Patient has noted persistent weight loss since having long COVID, though he has always been thin.  Since his COVID infection he reports that he has not had much of an appetite for anything though he has been trying to maintain his caloric intake.  He is trying to maintain his strength through regular exercise (since he has always been very active), but he has significant postexertional malaise which makes it difficult for him to exercise the way he used to.  This has been complicated by his elevated coronary artery calcium score and his A1c of 5.6%.  On the one hand he has been advised to stay away from foods that might worsen his lipid panel (animal products), and on the other he has been told to avoid carbohydrates to keep his A1c low.  In both cases, this affects his ability to take in enough calories to maintain his weight.  He has not yet seen the dietitian.          ROS:  Per HPI, otherwise: Negative  General: Denies fever/chills, weight loss, fatigue, recent illness, weakness  Skin:  Denies rashes, lesions  HEENT: Denies sore throat  Resp:  Denies SOB, dypsnea on exertion  CV:  Denies chest pain,  palpitations, diaphoresis  GI:  Weight loss  :  Denies dysuria, urgency, frequency, hematuria  MS:  Denies other joint pain, myalgias  Neuro:  Denies dizziness, balance problems, numbness/tingling  Endo:  Denies osteoporosis    No past medical history on file.  Social History     Tobacco Use    Smoking status: Never    Smokeless tobacco: Never   Vaping Use    Vaping status: Never Used   Substance Use Topics    Alcohol use: Yes     Comment: 2-3 beers weekly    Drug use: Not Currently     Current Outpatient Medications   Medication Sig Dispense Refill    tadalafil (CIALIS) 5 MG tablet Take 1 Tablet by mouth every day for 360 days. 90 Tablet 3    atorvastatin (LIPITOR) 40 MG Tab Take 1 Tablet by mouth every evening. 100 Tablet 4    finasteride (PROSCAR) 5 MG Tab Take 1 Tablet by mouth every day. 90 Tablet 3    famotidine (PEPCID) 40 MG Tab Take 40 mg by mouth every day.      cholestyramine (QUESTRAN) 4 g packet Take 1 Packet by mouth every day.      cycloSPORINE (RESTASIS) 0.05 % ophthalmic emulsion Administer 1 Drop into both eyes 2 times a day.      megestrol (MEGACE) 40 MG/ML Suspension Take 5 mL by mouth every day for 96 days. 240 mL 1    trospium (SANCTURA) 20 MG Tab Take 1 Tablet by mouth at bedtime. (Patient not taking: Reported on 10/1/2024) 30 Tablet 2     No current facility-administered medications for this visit.       /75   Pulse 96   Ht 1.829 m (6')   Wt 60.8 kg (134 lb)   SpO2 94%   BMI 18.17 kg/m²     Physical Exam:   Gen:           Alert and oriented, No apparent distress. Mood and affect appropriate, normal interaction with examiner.   Hearing:     Grossly intact.  Nose:          Normal, no lesions or deformities.  Dentition:    Good dentition.   Neck:        Supple, trachea midline, no masses.  Gait and Station: Normal.              Ext:           No cyanosis or edema.    Assessment and Plan:     1. Chronic fatigue  Unclear etiology - possible from long COVID  Check acth, cortisol and  igf-1    - ACTH; Future  - CORTISOL - AM  - IGF-1    2. Weight loss  As above, uncertain etiology  No evidence of malignancy has been found, nor any other obvious cause  Check acth, cortisol and igf-1  Will call pt with results  No need for f/u if these tests are unremarkable    - ACTH; Future  - CORTISOL - AM  - IGF-1      Orders Placed This Encounter    ACTH    CORTISOL - AM    IGF-1       No follow-ups on file.    Please note that this dictation was created using voice recognition software. I have made every reasonable attempt to correct obvious errors, but I expect that there are errors of grammar and possibly content that I did not discover before finalizing the note.     Ac Agarwal M.D.

## 2024-11-14 ENCOUNTER — OFFICE VISIT (OUTPATIENT)
Dept: CARDIOLOGY | Facility: MEDICAL CENTER | Age: 79
End: 2024-11-14
Attending: INTERNAL MEDICINE
Payer: MEDICARE

## 2024-11-14 VITALS
BODY MASS INDEX: 19.07 KG/M2 | HEART RATE: 85 BPM | SYSTOLIC BLOOD PRESSURE: 112 MMHG | HEIGHT: 72 IN | WEIGHT: 140.8 LBS | OXYGEN SATURATION: 94 % | DIASTOLIC BLOOD PRESSURE: 58 MMHG | RESPIRATION RATE: 16 BRPM

## 2024-11-14 DIAGNOSIS — R93.1 ELEVATED CORONARY ARTERY CALCIUM SCORE: ICD-10-CM

## 2024-11-14 DIAGNOSIS — I34.0 MODERATE MITRAL REGURGITATION: ICD-10-CM

## 2024-11-14 DIAGNOSIS — R93.1 AGATSTON CORONARY ARTERY CALCIUM SCORE GREATER THAN 400: ICD-10-CM

## 2024-11-14 DIAGNOSIS — E78.49 OTHER HYPERLIPIDEMIA: ICD-10-CM

## 2024-11-14 DIAGNOSIS — I25.10 CORONARY ARTERY DISEASE INVOLVING NATIVE CORONARY ARTERY OF NATIVE HEART WITHOUT ANGINA PECTORIS: ICD-10-CM

## 2024-11-14 PROCEDURE — 99212 OFFICE O/P EST SF 10 MIN: CPT | Performed by: INTERNAL MEDICINE

## 2024-11-14 PROCEDURE — 99214 OFFICE O/P EST MOD 30 MIN: CPT | Performed by: INTERNAL MEDICINE

## 2024-11-14 PROCEDURE — 3078F DIAST BP <80 MM HG: CPT | Performed by: INTERNAL MEDICINE

## 2024-11-14 PROCEDURE — G2211 COMPLEX E/M VISIT ADD ON: HCPCS | Performed by: INTERNAL MEDICINE

## 2024-11-14 PROCEDURE — 3074F SYST BP LT 130 MM HG: CPT | Performed by: INTERNAL MEDICINE

## 2024-11-14 RX ORDER — ATORVASTATIN CALCIUM 40 MG/1
40 TABLET, FILM COATED ORAL NIGHTLY
Qty: 100 TABLET | Refills: 4 | Status: SHIPPED | OUTPATIENT
Start: 2024-11-14

## 2024-11-14 ASSESSMENT — ENCOUNTER SYMPTOMS
SHORTNESS OF BREATH: 0
BRUISES/BLEEDS EASILY: 0
BLOOD IN STOOL: 0
VOMITING: 0
ORTHOPNEA: 0
EYE DISCHARGE: 0
LOSS OF CONSCIOUSNESS: 0
ABDOMINAL PAIN: 0
DIZZINESS: 0
HALLUCINATIONS: 0
DOUBLE VISION: 0
COUGH: 0
PND: 0
SPEECH CHANGE: 0
HEADACHES: 0
BLURRED VISION: 0
PALPITATIONS: 0
EYE PAIN: 0
CLAUDICATION: 0
WEIGHT LOSS: 0
CHILLS: 0
SENSORY CHANGE: 0
DEPRESSION: 0
NAUSEA: 0
MYALGIAS: 0
FEVER: 0
FALLS: 0

## 2024-11-14 ASSESSMENT — FIBROSIS 4 INDEX: FIB4 SCORE: 1.39

## 2024-11-14 NOTE — PROGRESS NOTES
Chief Complaint   Patient presents with    Follow-Up     Dx: Elevated coronary artery calcium score      Hyperlipidemia       Subjective     Pérez Soto is an 79 y.o. male who presents today due to elevated coronary Ca score (2607), HLP, moderate MR.    I have personally interpreted EKG today with patient, there is no evidence of acute coronary syndrome, no evidence of prior infarct, normal NJ and QT interval, no significant conduction disease. Sinus rhythm.    Clem Soto does not have any history of heart attack arrhythmias in the past. he never had transthoracic echocardiogram, cardiac catheterization or ablations procedure in the past.     No family history of sudden cardiac death.    I have independently interpreted and reviewed blood tests results with patient in clinic which shows LDL level of 87, triglycerides level of 68, GFR of 96, K of 4.    10/2024 I have independently interpreted and reviewed echocardiogram's actual images with patient which showed normal left ventricular systolic function. No wall motion abnormality. No evidence of pulmonary hypertension. Moderate MR.    In the interim, patient has been doing well without having any symptoms. Patient denies having chest pain, dyspnea, palpitation, presyncope, syncope episodes. Able to climb up at least 2 flights of stairs.    History reviewed. No pertinent past medical history.  Past Surgical History:   Procedure Laterality Date    LAMINOTOMY       History reviewed. No pertinent family history.  Social History     Socioeconomic History    Marital status:      Spouse name: Not on file    Number of children: Not on file    Years of education: Not on file    Highest education level: Not on file   Occupational History    Not on file   Tobacco Use    Smoking status: Never    Smokeless tobacco: Never   Vaping Use    Vaping status: Never Used   Substance and Sexual Activity    Alcohol use: Yes     Comment: 2-3 beers weekly    Drug use:  Not Currently    Sexual activity: Not on file     Comment: , 1 daughter, no grands   Other Topics Concern    Not on file   Social History Narrative    Not on file     Social Drivers of Health     Financial Resource Strain: Low Risk  (11/12/2023)    Received from [facility]    Overall Financial Resource Strain (CARDIA)     Difficulty of Paying Living Expenses: Not hard at all   Food Insecurity: No Food Insecurity (11/12/2023)    Received from [facility]    Hunger Vital Sign     Worried About Running Out of Food in the Last Year: Never true     Ran Out of Food in the Last Year: Never true   Transportation Needs: No Transportation Needs (11/12/2023)    Received from [facility]    PRAPARE - Transportation     Lack of Transportation (Medical): No     Lack of Transportation (Non-Medical): No   Physical Activity: Insufficiently Active (11/12/2023)    Received from [facility]    Exercise Vital Sign     Days of Exercise per Week: 3 days     Minutes of Exercise per Session: 30 min   Stress: No Stress Concern Present (11/12/2023)    Received from [facility]    [place] [place] [facility] [employer] - Occupational Stress Questionnaire     Feeling of Stress : Not at all   Social Connections: Moderately Integrated (11/12/2023)    Received from [facility]    Social Connection and Isolation Panel [NHANES]     Frequency of Communication with Friends and Family: Twice a week     Frequency of Social Gatherings with Friends and Family: Once a week     Attends Zoroastrianism Services: Never     Active Member of Clubs or Organizations: No     Attends Club or Organization Meetings: 1 to 4 times per year     Marital Status:    Intimate Partner Violence: Not At Risk (11/12/2023)    Received from [facility]    Humiliation, Afraid, Rape, and Kick questionnaire     Fear of Current or Ex-Partner: No     Emotionally Abused: No      Physically Abused: No     Sexually Abused: No   Housing Stability: Low Risk  (11/12/2023)    [facility]    Housing Stability Vital Sign     Unable to Pay for Housing in the Last Year: No     Number of Places Lived in the Last Year: 1     Unstable Housing in the Last Year: No     No Known Allergies  Outpatient Encounter Medications as of 11/14/2024   Medication Sig Dispense Refill    atorvastatin (LIPITOR) 40 MG Tab Take 1 Tablet by mouth every evening. 100 Tablet 4    tadalafil (CIALIS) 5 MG tablet Take 1 Tablet by mouth every day for 360 days. 90 Tablet 3    finasteride (PROSCAR) 5 MG Tab Take 1 Tablet by mouth every day. 90 Tablet 3    famotidine (PEPCID) 40 MG Tab Take 40 mg by mouth every day.      cholestyramine (QUESTRAN) 4 g packet Take 1 Packet by mouth every day.      cycloSPORINE (RESTASIS) 0.05 % ophthalmic emulsion Administer 1 Drop into both eyes 2 times a day.      [DISCONTINUED] megestrol (MEGACE) 40 MG/ML Suspension Take 5 mL by mouth every day for 96 days. (Patient not taking: Reported on 11/14/2024) 240 mL 1    [DISCONTINUED] atorvastatin (LIPITOR) 40 MG Tab Take 1 Tablet by mouth every evening. 100 Tablet 4    [DISCONTINUED] trospium (SANCTURA) 20 MG Tab Take 1 Tablet by mouth at bedtime. (Patient not taking: Reported on 10/1/2024) 30 Tablet 2     No facility-administered encounter medications on file as of 11/14/2024.     Review of Systems   Constitutional:  Negative for chills, fever, malaise/fatigue and weight loss.   HENT:  Negative for ear discharge, ear pain, hearing loss and nosebleeds.    Eyes:  Negative for blurred vision, double vision, pain and discharge.   Respiratory:  Negative for cough and shortness of breath.    Cardiovascular:  Negative for chest pain, palpitations, orthopnea, claudication, leg swelling and PND.   Gastrointestinal:  Negative for abdominal pain, blood in stool, melena, nausea and vomiting.   Genitourinary:  Negative for dysuria and  hematuria.   Musculoskeletal:  Negative for falls, joint pain and myalgias.   Skin:  Negative for itching and rash.   Neurological:  Negative for dizziness, sensory change, speech change, loss of consciousness and headaches.   Endo/Heme/Allergies:  Negative for environmental allergies. Does not bruise/bleed easily.   Psychiatric/Behavioral:  Negative for depression, hallucinations and suicidal ideas.               Objective     /58 (BP Location: Left arm, Patient Position: Sitting, BP Cuff Size: Adult)   Pulse 85   Resp 16   Ht 1.829 m (6')   Wt 63.9 kg (140 lb 12.8 oz)   SpO2 94%   BMI 19.10 kg/m²     Physical Exam  Vitals and nursing note reviewed.   Constitutional:       General: He is not in acute distress.     Appearance: He is not diaphoretic.   HENT:      Head: Normocephalic and atraumatic.      Right Ear: External ear normal.      Left Ear: External ear normal.      Nose: No congestion or rhinorrhea.   Eyes:      General:         Right eye: No discharge.         Left eye: No discharge.   Neck:      Thyroid: No thyromegaly.      Vascular: Carotid bruit present. No JVD.   Cardiovascular:      Rate and Rhythm: Normal rate and regular rhythm.      Pulses: Normal pulses.      Heart sounds: Murmur heard.   Pulmonary:      Effort: No respiratory distress.   Abdominal:      General: There is no distension.      Tenderness: There is no abdominal tenderness.   Musculoskeletal:         General: No swelling or tenderness.      Right lower leg: No edema.      Left lower leg: No edema.   Skin:     General: Skin is warm and dry.   Neurological:      Mental Status: He is alert and oriented to person, place, and time.      Cranial Nerves: No cranial nerve deficit.   Psychiatric:         Behavior: Behavior normal.                Assessment & Plan     1. Other hyperlipidemia [E78.49]  Basic Metabolic Panel    LIPID PANEL      2. Elevated coronary artery calcium score  atorvastatin (LIPITOR) 40 MG Tab      3.  Coronary artery disease involving native coronary artery of native heart without angina pectoris  atorvastatin (LIPITOR) 40 MG Tab    Basic Metabolic Panel    LIPID PANEL      4. Moderate mitral regurgitation  EC-ECHOCARDIOGRAM COMPLETE W/O CONT      5. Agatston coronary artery calcium score greater than 400 [R93.1]  atorvastatin (LIPITOR) 40 MG Tab            Medical Decision Making: Today's Assessment/Status/Plan:   At this time patient is clinically stable in terms of his cardiac standpoint.    Will continue to monitor MR. Repeat TTE in 6 months.    Will continue Atorvastatin to 40 mg daily. LDL is good.    This visit encounter signifies the visit complexity inherent to evaluation and management associated with medical care services that serve as the continuing focal point for all needed health care services and/or with medical care services that are part of ongoing care related to this patient's single, serious condition, complex cardiac condition.    Thomas Khan M.D.

## 2024-11-15 ENCOUNTER — OFFICE VISIT (OUTPATIENT)
Dept: MEDICAL GROUP | Facility: MEDICAL CENTER | Age: 79
End: 2024-11-15
Payer: MEDICARE

## 2024-11-15 VITALS
SYSTOLIC BLOOD PRESSURE: 92 MMHG | TEMPERATURE: 97.2 F | HEART RATE: 85 BPM | DIASTOLIC BLOOD PRESSURE: 44 MMHG | WEIGHT: 138 LBS | OXYGEN SATURATION: 97 % | BODY MASS INDEX: 18.69 KG/M2 | HEIGHT: 72 IN

## 2024-11-15 DIAGNOSIS — M54.42 CHRONIC BILATERAL LOW BACK PAIN WITH BILATERAL SCIATICA: ICD-10-CM

## 2024-11-15 DIAGNOSIS — M54.41 CHRONIC BILATERAL LOW BACK PAIN WITH BILATERAL SCIATICA: ICD-10-CM

## 2024-11-15 DIAGNOSIS — R11.0 CHRONIC NAUSEA: ICD-10-CM

## 2024-11-15 DIAGNOSIS — R63.4 WEIGHT LOSS: ICD-10-CM

## 2024-11-15 DIAGNOSIS — G89.29 CHRONIC BILATERAL LOW BACK PAIN WITH BILATERAL SCIATICA: ICD-10-CM

## 2024-11-15 PROCEDURE — 99214 OFFICE O/P EST MOD 30 MIN: CPT | Performed by: PHYSICIAN ASSISTANT

## 2024-11-15 PROCEDURE — 3078F DIAST BP <80 MM HG: CPT | Performed by: PHYSICIAN ASSISTANT

## 2024-11-15 PROCEDURE — 3074F SYST BP LT 130 MM HG: CPT | Performed by: PHYSICIAN ASSISTANT

## 2024-11-15 ASSESSMENT — FIBROSIS 4 INDEX: FIB4 SCORE: 1.39

## 2024-11-15 NOTE — PROGRESS NOTES
Subjective:     History of Present Illness  The patient presents for evaluation of weight loss.    His weight was recorded as 138 today, a decrease from 140 yesterday, and a significant increased from his previous weight of 130. He has been prescribed megestrol but has not yet started the medication. He reports an improvement in his appetite compared to three months ago. He is making efforts to gain weight and is engaging in weight lifting and other exercises. However, he experiences post-exertional malaise, feeling unwell for a couple of days after intense workouts. He is attempting to manage this by reducing the intensity of his workouts.    He is currently under the care of a neurosurgeon for back pain, which has not improved despite receiving an injection. He experiences pain in the back of his legs, which he describes as classic sciatica. This is a new development, as he previously only experienced pain in the front of his legs. He also suffers from chronic back pain.    His nausea has improved significantly.      Current medicines (including changes today)  Current Outpatient Medications   Medication Sig Dispense Refill    atorvastatin (LIPITOR) 40 MG Tab Take 1 Tablet by mouth every evening. 100 Tablet 4    tadalafil (CIALIS) 5 MG tablet Take 1 Tablet by mouth every day for 360 days. 90 Tablet 3    finasteride (PROSCAR) 5 MG Tab Take 1 Tablet by mouth every day. 90 Tablet 3    famotidine (PEPCID) 40 MG Tab Take 40 mg by mouth every day.      cholestyramine (QUESTRAN) 4 g packet Take 1 Packet by mouth every day.      cycloSPORINE (RESTASIS) 0.05 % ophthalmic emulsion Administer 1 Drop into both eyes 2 times a day.       No current facility-administered medications for this visit.     He  has no past medical history on file.    ROS   No chest pain, no shortness of breath, no abdominal pain  Positive ROS as per HPI.  All other systems reviewed and are negative.     Objective:     BP 92/44 (BP Location: Left arm,  Patient Position: Sitting, BP Cuff Size: Adult)   Pulse 85   Temp 36.2 °C (97.2 °F) (Temporal)   Ht 1.829 m (6')   Wt 62.6 kg (138 lb)   SpO2 97%  Body mass index is 18.72 kg/m².   Physical Exam  Vital Signs  Body mass index is 18.72.  Constitutional: Alert, no distress.  Skin: Warm, dry, good turgor, no rashes in visible areas.  Eye: Equal, round and reactive, conjunctiva clear, lids normal.  ENMT: Lips without lesions, good dentition, oropharynx clear.  Neck: Trachea midline, no masses, no thyromegaly.   Psych: Alert and oriented x3, normal affect and mood.      Results  Imaging  Echocardiogram was good.        Assessment and Plan:   The following treatment plan was discussed    Assessment & Plan  1. Weight loss.  Chronic condition.  His weight has increased from 130 to 140 pounds recently, and his appetite has improved. He has not started taking megestrol yet. He was advised to monitor his weight and consider starting megestrol 5 mL by mouth every day if needed. If he decides to start, he may take it every other day initially and then daily if well-tolerated. Potential side effects were discussed. He was also encouraged to continue exercising and lifting weights, keeping the weights low and gradually building up.    2. Chronic back pain.  New condition noted in chart but chronic.  He reports chronic back pain and sciatica down both legs, which has worsened recently. He received a steroid injection in the back, but it did not provide relief. He was advised to follow up with neurosurgery for further evaluation and management.    3. Nausea.  His nausea has significantly improved. No changes to the current management plan were made.    4. Adrenal insufficiency workup.  Dr. Agarwal ordered labs to check for adrenal insufficiency, including cortisol levels. He was advised to go for the lab tests in the morning.    Follow-up  Return in 3 months for follow up.      ORDERS:  1. Weight loss      2. Chronic bilateral  low back pain with bilateral sciatica      3. Chronic nausea          Please note that this dictation was created using voice recognition software. I have made every reasonable attempt to correct obvious errors, but I expect that there are errors of grammar and possibly content that I did not discover before finalizing the note.      Attestation      Verbal consent was acquired by the patient to use [employer] ambient listening note generation during this visit No

## 2024-11-25 ENCOUNTER — HOSPITAL ENCOUNTER (OUTPATIENT)
Dept: LAB | Facility: MEDICAL CENTER | Age: 79
End: 2024-11-25
Attending: INTERNAL MEDICINE
Payer: MEDICARE

## 2024-11-25 DIAGNOSIS — E78.49 OTHER HYPERLIPIDEMIA: ICD-10-CM

## 2024-11-25 DIAGNOSIS — I25.10 CORONARY ARTERY DISEASE INVOLVING NATIVE CORONARY ARTERY OF NATIVE HEART WITHOUT ANGINA PECTORIS: ICD-10-CM

## 2024-11-25 LAB
ANION GAP SERPL CALC-SCNC: 10 MMOL/L (ref 7–16)
BUN SERPL-MCNC: 22 MG/DL (ref 8–22)
CALCIUM SERPL-MCNC: 8.7 MG/DL (ref 8.4–10.2)
CHLORIDE SERPL-SCNC: 104 MMOL/L (ref 96–112)
CHOLEST SERPL-MCNC: 143 MG/DL (ref 100–199)
CO2 SERPL-SCNC: 29 MMOL/L (ref 20–33)
CREAT SERPL-MCNC: 0.6 MG/DL (ref 0.5–1.4)
FASTING STATUS PATIENT QL REPORTED: NORMAL
GFR SERPLBLD CREATININE-BSD FMLA CKD-EPI: 97 ML/MIN/1.73 M 2
GLUCOSE SERPL-MCNC: 91 MG/DL (ref 65–99)
HDLC SERPL-MCNC: 62 MG/DL
LDLC SERPL CALC-MCNC: 69 MG/DL
POTASSIUM SERPL-SCNC: 4.3 MMOL/L (ref 3.6–5.5)
SODIUM SERPL-SCNC: 143 MMOL/L (ref 135–145)
TRIGL SERPL-MCNC: 61 MG/DL (ref 0–149)

## 2024-11-25 PROCEDURE — 80061 LIPID PANEL: CPT

## 2024-11-25 PROCEDURE — 80048 BASIC METABOLIC PNL TOTAL CA: CPT

## 2024-11-25 PROCEDURE — 36415 COLL VENOUS BLD VENIPUNCTURE: CPT

## 2024-12-06 ENCOUNTER — TELEPHONE (OUTPATIENT)
Dept: HEALTH INFORMATION MANAGEMENT | Facility: OTHER | Age: 79
End: 2024-12-06
Payer: MEDICARE

## 2024-12-07 ENCOUNTER — APPOINTMENT (OUTPATIENT)
Dept: URBAN - METROPOLITAN AREA CLINIC 15 | Facility: CLINIC | Age: 79
Setting detail: DERMATOLOGY
End: 2024-12-07

## 2024-12-07 DIAGNOSIS — L82.1 OTHER SEBORRHEIC KERATOSIS: ICD-10-CM

## 2024-12-07 DIAGNOSIS — D22 MELANOCYTIC NEVI: ICD-10-CM

## 2024-12-07 DIAGNOSIS — L81.4 OTHER MELANIN HYPERPIGMENTATION: ICD-10-CM

## 2024-12-07 DIAGNOSIS — D18.0 HEMANGIOMA: ICD-10-CM

## 2024-12-07 DIAGNOSIS — Z71.89 OTHER SPECIFIED COUNSELING: ICD-10-CM

## 2024-12-07 PROCEDURE — ? COUNSELING

## 2024-12-07 ASSESSMENT — LOCATION SIMPLE DESCRIPTION DERM
LOCATION SIMPLE: LEFT CHEEK
LOCATION SIMPLE: RIGHT LOWER BACK
LOCATION SIMPLE: LEFT UPPER BACK
LOCATION SIMPLE: CHEST

## 2024-12-07 ASSESSMENT — LOCATION DETAILED DESCRIPTION DERM
LOCATION DETAILED: LEFT CENTRAL MALAR CHEEK
LOCATION DETAILED: RIGHT MEDIAL INFERIOR CHEST
LOCATION DETAILED: LEFT INFERIOR UPPER BACK
LOCATION DETAILED: RIGHT SUPERIOR LATERAL MIDBACK

## 2024-12-07 ASSESSMENT — LOCATION ZONE DERM
LOCATION ZONE: FACE
LOCATION ZONE: TRUNK

## 2024-12-11 ENCOUNTER — APPOINTMENT (OUTPATIENT)
Dept: URBAN - METROPOLITAN AREA CLINIC 36 | Facility: CLINIC | Age: 79
Setting detail: DERMATOLOGY
End: 2024-12-11

## 2024-12-11 DIAGNOSIS — L71.8 OTHER ROSACEA: ICD-10-CM | Status: INADEQUATELY CONTROLLED

## 2024-12-11 DIAGNOSIS — D22 MELANOCYTIC NEVI: ICD-10-CM

## 2024-12-11 DIAGNOSIS — L82.1 OTHER SEBORRHEIC KERATOSIS: ICD-10-CM

## 2024-12-11 DIAGNOSIS — D18.0 HEMANGIOMA: ICD-10-CM

## 2024-12-11 DIAGNOSIS — L81.4 OTHER MELANIN HYPERPIGMENTATION: ICD-10-CM

## 2024-12-11 DIAGNOSIS — L85.3 XEROSIS CUTIS: ICD-10-CM

## 2024-12-11 DIAGNOSIS — Z71.89 OTHER SPECIFIED COUNSELING: ICD-10-CM

## 2024-12-11 PROBLEM — D22.5 MELANOCYTIC NEVI OF TRUNK: Status: ACTIVE | Noted: 2024-12-11

## 2024-12-11 PROBLEM — D18.01 HEMANGIOMA OF SKIN AND SUBCUTANEOUS TISSUE: Status: ACTIVE | Noted: 2024-12-11

## 2024-12-11 PROCEDURE — ? PRESCRIPTION

## 2024-12-11 PROCEDURE — 99214 OFFICE O/P EST MOD 30 MIN: CPT

## 2024-12-11 PROCEDURE — ? COUNSELING

## 2024-12-11 RX ORDER — METRONIDAZOLE 7.5 MG/G
CREAM TOPICAL
Qty: 45 | Refills: 11 | Status: ERX | COMMUNITY
Start: 2024-12-11

## 2024-12-11 RX ADMIN — METRONIDAZOLE: 7.5 CREAM TOPICAL at 00:00

## 2024-12-11 ASSESSMENT — LOCATION DETAILED DESCRIPTION DERM
LOCATION DETAILED: LEFT CENTRAL MALAR CHEEK
LOCATION DETAILED: RIGHT SUPERIOR LATERAL MIDBACK
LOCATION DETAILED: RIGHT MEDIAL INFERIOR CHEST
LOCATION DETAILED: RIGHT FOREHEAD

## 2024-12-11 ASSESSMENT — LOCATION ZONE DERM
LOCATION ZONE: FACE
LOCATION ZONE: TRUNK

## 2024-12-11 ASSESSMENT — LOCATION SIMPLE DESCRIPTION DERM
LOCATION SIMPLE: RIGHT FOREHEAD
LOCATION SIMPLE: CHEST
LOCATION SIMPLE: LEFT CHEEK
LOCATION SIMPLE: RIGHT LOWER BACK

## 2024-12-31 ENCOUNTER — OFFICE VISIT (OUTPATIENT)
Dept: URGENT CARE | Facility: CLINIC | Age: 79
End: 2024-12-31
Payer: MEDICARE

## 2024-12-31 VITALS
WEIGHT: 138.6 LBS | OXYGEN SATURATION: 94 % | BODY MASS INDEX: 18.77 KG/M2 | HEIGHT: 72 IN | DIASTOLIC BLOOD PRESSURE: 74 MMHG | HEART RATE: 82 BPM | TEMPERATURE: 97.7 F | SYSTOLIC BLOOD PRESSURE: 116 MMHG | RESPIRATION RATE: 18 BRPM

## 2024-12-31 DIAGNOSIS — S51.012A SKIN TEAR OF ELBOW WITHOUT COMPLICATION, LEFT, INITIAL ENCOUNTER: ICD-10-CM

## 2024-12-31 ASSESSMENT — FIBROSIS 4 INDEX: FIB4 SCORE: 1.39

## 2024-12-31 NOTE — PROGRESS NOTES
CHIEF COMPLAINT  Chief Complaint   Patient presents with    Wound Check     Left arm 4 weeks      Subjective:   Clem Soto is a 79 y.o. male who presents to urgent care with concerns for injury to his left elbow.  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  He reports cleaning and dressing it yesterday.  Denies any direct trauma or fall.         PAST MEDICAL HISTORY  Patient Active Problem List    Diagnosis Date Noted    Chronic bilateral low back pain with bilateral sciatica 11/15/2024    Agatston coronary artery calcium score greater than 400 08/20/2024    Ganglion cyst of wrist, right 08/16/2024    Right inguinal hernia 08/16/2024    Weight loss 08/16/2024    Abnormal CT scan of lung 08/16/2024    Chronic nausea 06/21/2024    Generalized OA 05/17/2024    Other hyperlipidemia 05/17/2024    Benign prostatic hyperplasia with nocturia 05/17/2024    Dry eye syndrome of both eyes 05/17/2024    MVP (mitral valve prolapse) 05/17/2024    Age-related osteoporosis without current pathological fracture 05/17/2024    Irritable bowel syndrome with diarrhea 05/17/2024    Chronic fatigue 05/17/2024       SURGICAL HISTORY   has a past surgical history that includes laminotomy.    ALLERGIES  No Known Allergies    CURRENT MEDICATIONS  Home Medications       Reviewed by Aiden Khan Ass't (Medical Assistant) on 12/31/24 at 1051  Med List Status: <None>     Medication Last Dose Status   atorvastatin (LIPITOR) 40 MG Tab Taking Active   cholestyramine (QUESTRAN) 4 g packet Taking Active   cycloSPORINE (RESTASIS) 0.05 % ophthalmic emulsion Taking Active   famotidine (PEPCID) 40 MG Tab Taking Active   finasteride (PROSCAR) 5 MG Tab Taking Active   tadalafil (CIALIS) 5 MG tablet Taking Active                    SOCIAL HISTORY  Social History     Tobacco Use    Smoking status: Never    Smokeless tobacco: Never   Vaping Use    Vaping status:  Never Used   Substance and Sexual Activity    Alcohol use: Yes     Comment: 2-3 beers weekly    Drug use: Not Currently    Sexual activity: Not on file     Comment: , 1 daughter, no grands       FAMILY HISTORY  No family history on file.      Medications, Allergies, and current problem list reviewed today in Epic.     Objective:     /74   Pulse 82   Temp 36.5 °C (97.7 °F) (Temporal)   Resp 18   Ht 1.829 m (6')   Wt 62.9 kg (138 lb 9.6 oz)   SpO2 94%     Physical Exam  Constitutional:       General: He is not in acute distress.     Appearance: Normal appearance. He is not ill-appearing or toxic-appearing.   Pulmonary:      Effort: Pulmonary effort is normal.   Skin:            Comments: Approximately 2 inch in diameter skin tear to left elbow.   Neurological:      General: No focal deficit present.      Mental Status: He is alert.   Psychiatric:         Mood and Affect: Mood normal.         Assessment/Plan:     Diagnosis and associated orders:     1. Skin tear of elbow without complication, left, initial encounter  Referral to Wound Clinic         Comments/MDM:     Wound irrigated and thoroughly cleansed.  Vaseline gauze applied.  Dressing secured with nonadherent gauze and tape.  Counseled on appropriate management, and to change dressing every 12 hours or if dressing becomes soiled.  Referral sent to wound care for further evaluation and management  Return to clinic as needed.         Differential diagnosis, natural history, supportive care, and indications for immediate follow-up discussed.    Advised the patient to follow-up with the primary care physician for recheck, reevaluation, and consideration of further management.    Please note that this dictation was created using voice recognition software. I have made a reasonable attempt to correct obvious errors, but I expect that there are errors of grammar and possibly content that I did not discover before finalizing the note.    This note was  electronically signed by HALLE Jordan

## 2025-01-08 ENCOUNTER — NON-PROVIDER VISIT (OUTPATIENT)
Dept: WOUND CARE | Facility: MEDICAL CENTER | Age: 80
End: 2025-01-08
Payer: MEDICARE

## 2025-01-08 PROCEDURE — 97597 DBRDMT OPN WND 1ST 20 CM/<: CPT

## 2025-01-08 PROCEDURE — 99211 OFF/OP EST MAY X REQ PHY/QHP: CPT

## 2025-01-09 NOTE — PATIENT INSTRUCTIONS
-Keep your wound dressing clean, dry, and intact.     -Ok to shower and get wound wet.    -Change your dressing weekly and if it becomes soiled, soaked, or falls off.    -When you change your dressings at home: Wash your wound with normal saline, wound cleanser, or unscented soap and water prior to applying your new dressings. Please avoid cleansing with hydrogen peroxide or rubbing alcohol. Hydrogen peroxide and rubbing alcohol are toxic to new tissue and skin cells.    -Should you experience any significant changes in your wound(s), such as signs of infection (increasing redness, swelling, localized heat, increased pain, fever > 101 F, chills) or have any questions regarding your home care instructions, please contact the wound center at [phone]. If after hours, contact your primary care physician or go to the hospital emergency room.     If you are admitted to any hospital, you will need a new referral to come back to the wound clinic and any scheduled appointments that you already have may be cancelled.     -If you are 5 or more minutes late for an appointment, we reserve the right to cancel and reschedule that appointment. Additionally, if you are habitually late or not attending appts (3 late cancellations and/or no shows), we reserve the right to cancel your remaining appointments and it will be your responsibility to obtain a new referral if services are still needed.

## 2025-01-09 NOTE — CERTIFICATION
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 He was seen at  and states it has healed significantly since that time. Pt and spouse feel they can manage remainder of care needed. No future appts scheduled.    Pertinent Labs and Diagnostics:    Labs:     A1c:   Lab Results   Component Value Date/Time    HBA1C 5.6 08/20/2024 11:44 AM          IMAGING: n/a    VASCULAR STUDIES: n/a    LAST  WOUND CULTURE: n/a             Patient allergies and medications reviewed via Epic.     Wound Assessment:  Wound 01/08/25 Skin Tear Elbow Lateral Left (Active)   Wound Image   01/08/25 1600   Site Assessment Pink;Yellow 01/08/25 1600   Periwound Assessment Purple;Intact;Dry;Ecchymosis 01/08/25 1600   Margins Attached edges 01/08/25 1600   Closure Secondary intention 01/08/25 1600   Drainage Amount Small 01/08/25 1600   Drainage Description Serous 01/08/25 1600   Treatments Cleansed;CSWD - Conservative Sharp Wound Debridement;Nonselective debridement 01/08/25 1600   Wound Cleansing Hypochlorus Acid 01/08/25 1600   Periwound Protectant No-sting Skin Prep 01/08/25 1600   Dressing Changed New 01/08/25 1600   Dressing Cleansing/Solutions Not Applicable 01/08/25 1600   Dressing Options Adaptic;Silicone Adhesive Foam;Tubigrip E 01/08/25 1600   Dressing Change/Treatment Frequency Weekly, and As Needed 01/08/25 1600   Wound Team Following Other (comment) 01/08/25 1600   Non-staged Wound Description Full thickness 01/08/25 1600   Post-Procedure Length (cm) 4.2 cm 01/08/25 1600   Post-Procedure Width (cm) 1.2 cm 01/08/25 1600   Post-Procedure Depth (cm) 0.1 cm 01/08/25 1600   Post-Procedure Surface Area (cm^2) 5.04 cm^2  01/08/25 1600   Post-Procedure Volume (cm^3) 0.504 cm^3 01/08/25 1600   Tunneling (cm) 0 cm 01/08/25 1600   Undermining (cm) 0 cm 01/08/25 1600   Wound Odor None 01/08/25 1600   Exposed Structures None 01/08/25 1600     Debridement: Forceps/scissors used to cut away dead area of skin flap. Gauze used to non-selectively debride layer of yellow slough from wound bed. Pt tolerated well.    PATIENT EDUCATION  -Advised to go to ER for any increased redness, swelling, drainage or odor, or if patient develops fever, chills, nausea or vomiting.  -Importance of adequate nutrition for wound healing  -Increase protein intake (unless contraindicated by renal status)

## 2025-01-13 ENCOUNTER — APPOINTMENT (OUTPATIENT)
Dept: WOUND CARE | Facility: MEDICAL CENTER | Age: 80
End: 2025-01-13
Payer: MEDICARE

## 2025-01-22 ENCOUNTER — APPOINTMENT (OUTPATIENT)
Dept: WOUND CARE | Facility: MEDICAL CENTER | Age: 80
End: 2025-01-22
Payer: MEDICARE

## 2025-01-25 ASSESSMENT — ENCOUNTER SYMPTOMS
WHEEZING: 0
CHEST TIGHTNESS: 0
HEMOPTYSIS: 0
SHORTNESS OF BREATH: 0
DYSPNEA AT REST: 0

## 2025-01-28 ENCOUNTER — OFFICE VISIT (OUTPATIENT)
Dept: SLEEP MEDICINE | Facility: MEDICAL CENTER | Age: 80
End: 2025-01-28
Attending: PHYSICIAN ASSISTANT
Payer: MEDICARE

## 2025-01-28 VITALS
HEIGHT: 72 IN | OXYGEN SATURATION: 94 % | WEIGHT: 138 LBS | SYSTOLIC BLOOD PRESSURE: 112 MMHG | DIASTOLIC BLOOD PRESSURE: 66 MMHG | BODY MASS INDEX: 18.69 KG/M2 | HEART RATE: 85 BPM

## 2025-01-28 DIAGNOSIS — R91.1 INCIDENTAL PULMONARY NODULE, > 3MM AND < 8MM: ICD-10-CM

## 2025-01-28 DIAGNOSIS — R91.8 OTHER NONSPECIFIC ABNORMAL FINDING OF LUNG FIELD: ICD-10-CM

## 2025-01-28 DIAGNOSIS — J21.9 BRONCHIOLITIS: ICD-10-CM

## 2025-01-28 DIAGNOSIS — J84.10 GRANULOMATOUS LUNG DISEASE (HCC): ICD-10-CM

## 2025-01-28 DIAGNOSIS — J94.8 PLEURAL CALCIFICATION: ICD-10-CM

## 2025-01-28 PROCEDURE — 99213 OFFICE O/P EST LOW 20 MIN: CPT | Performed by: INTERNAL MEDICINE

## 2025-01-28 PROCEDURE — 99215 OFFICE O/P EST HI 40 MIN: CPT | Performed by: INTERNAL MEDICINE

## 2025-01-28 RX ORDER — TRAMADOL HYDROCHLORIDE 50 MG/1
TABLET ORAL
COMMUNITY
Start: 2025-01-18

## 2025-01-28 ASSESSMENT — ENCOUNTER SYMPTOMS
SHORTNESS OF BREATH: 0
HEMOPTYSIS: 0
WEIGHT LOSS: 1
WHEEZING: 0

## 2025-01-28 ASSESSMENT — FIBROSIS 4 INDEX: FIB4 SCORE: 1.39

## 2025-01-28 NOTE — PROGRESS NOTES
"Pulmonary Clinic- Initial Consult    Date of Service: 1/27/2025    Referring Physician: Andrés Ballard P.A.-*    Reason for Consult:   R06.02 (ICD-10-CM) - Shortness of breath   R91.8 (ICD-10-CM) - Abnormal CT scan of lung     Chief Complaint:   Chief Complaint   Patient presents with    Results     CT-CHEST,ABD,PEL 07/25/24    Establish Care     Referred by SUMYAA Ocampo-C  SOB, Abnormal CT scan of lung     HPI:   Pérez Soto is a very pleasant 79 y.o. male never smoker referred to the pulmonary clinic for abnormal CT chest 7/2024    Pérez recently moved from [facility] to [facility] in 5/2024.  1 year prior to his relocation he contracted COVID and reports that he has had \"long COVID\" symptoms ever since.  This includes a loss of weight, poor appetite, and chronic joint pain which is worsened.  He has chronic neuraxial joint pain as well as synovitis/pain in his DIP and MCP joints.  He reports that his prior workup for autoimmune conditions including rheumatoid arthritis has been negative.      From a pulmonary perspective he reports no respiratory symptoms whatsoever.  He is limited primarily by arthritis in his back more so than any shortness of breath.  He reports no aspiration or reflux symptoms.  No sputum production whatsoever.  Mild nasal congestion in the mornings which resolves in about half an hour on his own.    CT chest was performed in 7/2024, personally reviewed demonstrating apical scarring suggestive of old granulomatous disease with associated pleural calcifications and tree-in-bud opacities with bronchial wall thickening and mild bronchiectasis in the right upper lobe, as well as a 5 mm nodule in the lingula    He is a retired psychologist.  He has lived throughout the [place].  No TB exposures.  No  history.    PMH: Mild pectus excavatum, mitral valve prolapse with moderate mitral regurgitation, coronary artery calcifications, diverticulosis    MMRC Grade: n/a, " primarily limited by back pain see above        Past Medical History:   Diagnosis Date    Cough        Past Surgical History:   Procedure Laterality Date    LAMINOTOMY         Social History     Socioeconomic History    Marital status:      Spouse name: Not on file    Number of children: Not on file    Years of education: Not on file    Highest education level: Not on file   Occupational History    Not on file   Tobacco Use    Smoking status: Never    Smokeless tobacco: Never   Vaping Use    Vaping status: Never Used   Substance and Sexual Activity    Alcohol use: Yes     Comment: 2-3 beers weekly    Drug use: Not Currently    Sexual activity: Not on file     Comment: , 1 daughter, no grands   Other Topics Concern    Not on file   Social History Narrative    Not on file     Social Drivers of Health     Financial Resource Strain: Low Risk  (11/12/2023)    Received from [facility]    Overall Financial Resource Strain (CARDIA)     Difficulty of Paying Living Expenses: Not hard at all   Food Insecurity: No Food Insecurity (11/12/2023)    Received from [facility]    Hunger Vital Sign     Worried About Running Out of Food in the Last Year: Never true     Ran Out of Food in the Last Year: Never true   Transportation Needs: No Transportation Needs (11/12/2023)    Received from [facility]    PRAPARE - Transportation     Lack of Transportation (Medical): No     Lack of Transportation (Non-Medical): No   Physical Activity: Insufficiently Active (11/12/2023)    Received from [facility]    Exercise Vital Sign     Days of Exercise per Week: 3 days     Minutes of Exercise per Session: 30 min   Stress: No Stress Concern Present (11/12/2023)    Received from [facility]    [employer] [place] [employer] - Occupational Stress Questionnaire     Feeling of Stress : Not at all   Social Connections: Moderately Integrated (11/12/2023)    [facility]  [facility]    Social Connection and Isolation Panel [NHANES]     Frequency of Communication with Friends and Family: Twice a week     Frequency of Social Gatherings with Friends and Family: Once a week     Attends Restorationism Services: Never     Active Member of Clubs or Organizations: No     Attends Club or Organization Meetings: 1 to 4 times per year     Marital Status:    Intimate Partner Violence: Not At Risk (11/12/2023)    Received from [facility]    Humiliation, Afraid, Rape, and Kick questionnaire     Fear of Current or Ex-Partner: No     Emotionally Abused: No     Physically Abused: No     Sexually Abused: No   Housing Stability: Low Risk  (11/12/2023)    Received from [facility]    Housing Stability Vital Sign     Unable to Pay for Housing in the Last Year: No     Number of Places Lived in the Last Year: 1     Unstable Housing in the Last Year: No          No family history on file.    Current Outpatient Medications on File Prior to Visit   Medication Sig Dispense Refill    traMADol (ULTRAM) 50 MG Tab TAKE 1 TABLET BY MOUTH THREE TIMES A DAY AS NEEDED FOR 30 DAYS      ASPIRIN 81 PO Take  by mouth.      Coenzyme Q10 (Q-10 CO-ENZYME PO) Take  by mouth.      Cholecalciferol (D3) 1000 UNIT Cap Take  by mouth.      Omega-3 Fatty Acids (OMEGA 3 PO) Take 1,280 mg by mouth in the morning, at noon, and at bedtime.      atorvastatin (LIPITOR) 40 MG Tab Take 1 Tablet by mouth every evening. 100 Tablet 4    tadalafil (CIALIS) 5 MG tablet Take 1 Tablet by mouth every day for 360 days. 90 Tablet 3    finasteride (PROSCAR) 5 MG Tab Take 1 Tablet by mouth every day. 90 Tablet 3    famotidine (PEPCID) 40 MG Tab Take 40 mg by mouth every day.      cholestyramine (QUESTRAN) 4 g packet Take 1 Packet by mouth every day.      cycloSPORINE (RESTASIS) 0.05 % ophthalmic emulsion Administer 1 Drop into both eyes 2 times a day.       No current facility-administered medications on  file prior to visit.       Allergies: Patient has no known allergies.      ROS:   Review of Systems   Constitutional:  Positive for weight loss.   Respiratory:  Negative for hemoptysis, shortness of breath and wheezing.    All other systems reviewed and are negative.      Vitals:  /66 (BP Location: Left arm, Patient Position: Sitting, BP Cuff Size: Adult)   Pulse 85   Ht 1.829 m (6')   Wt 62.6 kg (138 lb)   SpO2 94%     Physical Exam:  Physical Exam  Vitals and nursing note reviewed.   Constitutional:       General: He is not in acute distress.     Appearance: He is well-developed and underweight. He is not diaphoretic.      Comments: Very pleasant   HENT:      Nose: Nose normal.      Mouth/Throat:      Pharynx: No oropharyngeal exudate.   Eyes:      General: No scleral icterus.        Right eye: No discharge.         Left eye: No discharge.      Conjunctiva/sclera: Conjunctivae normal.      Pupils: Pupils are equal, round, and reactive to light.   Neck:      Thyroid: No thyromegaly.      Vascular: No JVD.      Trachea: No tracheal deviation.   Cardiovascular:      Rate and Rhythm: Normal rate and regular rhythm.      Heart sounds: Normal heart sounds. No murmur heard.  Pulmonary:      Effort: Pulmonary effort is normal. No respiratory distress.      Breath sounds: Normal breath sounds. No stridor. No wheezing or rales.   Abdominal:      General: There is no distension.      Palpations: Abdomen is soft.      Tenderness: There is no abdominal tenderness. There is no guarding.   Musculoskeletal:         General: No tenderness. Normal range of motion.      Cervical back: Neck supple.   Lymphadenopathy:      Cervical: No cervical adenopathy.   Skin:     General: Skin is warm and dry.      Capillary Refill: Capillary refill takes less than 2 seconds.      Coloration: Skin is not pale.      Findings: No erythema.   Neurological:      Mental Status: He is alert and oriented to person, place, and time.       Sensory: No sensory deficit.      Motor: No abnormal muscle tone.      Coordination: Coordination normal.      Deep Tendon Reflexes: Reflexes normal.   Psychiatric:         Behavior: Behavior normal.         Thought Content: Thought content normal.         Judgment: Judgment normal.         Laboratory Data:    PFTs as reviewed by me personally show:  See HPI    Imaging as reviewed by me personally show:    See HPI    Assessment/Plan:    1. Bronchiolitis  PULMONARY FUNCTION TESTS -Test requested: Complete Pulmonary Function Test    AFB Culture    RHEUMATOID ARTHRITIS PANEL    CBC WITH DIFFERENTIAL    IMMUNOGLOBULINS A/G/M SERUM    CT-CHEST (THORAX) W/O    HLA-B27      2. Granulomatous lung disease (HCC)  Coccidioides Antibodies Panel, Serum    Histoplasmosis Ab CF    Quantiferon Gold Plus TB (4 tube)      3. Pleural calcification  Coccidioides Antibodies Panel, Serum    Histoplasmosis Ab CF    Quantiferon Gold Plus TB (4 tube)      4. Incidental pulmonary nodule, > 3mm and < 8mm        5. Adult BMI <19 kg/sq m        6. Other nonspecific abnormal finding of lung field  PULMONARY FUNCTION TESTS -Test requested: Complete Pulmonary Function Test    HLA-B27        Mild bronchiectasis and mucous plugging in the right middle lobe and lingula in a [place] pattern.  No respiratory symptoms whatsoever.  No fevers.  No sputum production.  No shortness of breath.  Weight loss has been attributed to long COVID.  PFTs.  Sputum cultures for ESTER.  RA/CCP, immunoglobulins, HLA-B27 sent.  Associated with pleural calcifications.  Fungal serologies and IGRA sent.  Again, no pulmonary symptoms.  See plan of care as outlined above.  Likely mucous plugging.  Repeat CT chest 7/2025.  Borderline underweight attributed to poor appetite following COVID, but has been gaining more weight recently. CT C/A/P with no evidence of occult malignancy.  The degree of his bronchiectasis and mucous plugging on CT is very mild and unlikely related  to his weight loss.    Return in about 6 months (around 7/28/2025).     This note was generated using voice recognition software which has a chance of producing errors of grammar and possibly content.  I have made every reasonable attempt to find and correct any obvious errors, but it should be expected that some may not be found prior to finalization of this note.    Time spent in record review prior to patient arrival, reviewing results, and in face-to-face encounter totaled 63 min, excluding any procedures if performed.  __________  Clem Lu MD  Pulmonary and Critical Care Medicine  [facility]

## 2025-02-04 ENCOUNTER — NON-PROVIDER VISIT (OUTPATIENT)
Dept: SLEEP MEDICINE | Facility: MEDICAL CENTER | Age: 80
End: 2025-02-04
Attending: INTERNAL MEDICINE
Payer: MEDICARE

## 2025-02-04 VITALS — HEIGHT: 72 IN | BODY MASS INDEX: 18.69 KG/M2 | WEIGHT: 138 LBS

## 2025-02-04 DIAGNOSIS — J21.9 BRONCHIOLITIS: ICD-10-CM

## 2025-02-04 DIAGNOSIS — R91.8 OTHER NONSPECIFIC ABNORMAL FINDING OF LUNG FIELD: ICD-10-CM

## 2025-02-04 PROCEDURE — 94060 EVALUATION OF WHEEZING: CPT | Mod: 26 | Performed by: INTERNAL MEDICINE

## 2025-02-04 PROCEDURE — 94726 PLETHYSMOGRAPHY LUNG VOLUMES: CPT | Performed by: INTERNAL MEDICINE

## 2025-02-04 PROCEDURE — 94729 DIFFUSING CAPACITY: CPT | Mod: 26 | Performed by: INTERNAL MEDICINE

## 2025-02-04 PROCEDURE — 94060 EVALUATION OF WHEEZING: CPT | Performed by: INTERNAL MEDICINE

## 2025-02-04 PROCEDURE — 94729 DIFFUSING CAPACITY: CPT | Performed by: INTERNAL MEDICINE

## 2025-02-04 PROCEDURE — 94726 PLETHYSMOGRAPHY LUNG VOLUMES: CPT | Mod: 26 | Performed by: INTERNAL MEDICINE

## 2025-02-04 ASSESSMENT — FIBROSIS 4 INDEX: FIB4 SCORE: 1.39

## 2025-02-04 NOTE — PROCEDURES
Technician: CHRIS De La Torre    Technician Comment:  Good patient effort & cooperation.  The results of this test meet the ATS/ERS standards for acceptability & reproducibility.  Test was performed on the [employer] Plethysmograph-Elite DX system.  Predicted values were GLI-2012 for spirometry, GLI-2020 for DLCO, ITS for Lung Volumes.  The DLCO was uncorrected for Hgb.  A bronchodilator of Albuterol HFA -2puffs via spacer administered.  DLCO performed during dilation period.    Interpretation:  Normal baseline spirometry with a negative bronchodilator response.  Flow volume loops are consistent with spirometric data.  Full lung volumes demonstrate air trapping.  The DLCO is normal.    Summary:  Normal spirometry with significant air trapping.  This is consistent with the reported history of bronchiolitis.  Correlate with imaging and exam.    I, Arthur Cadet DO, am the author of this note.     Arthur Cadet DO, [facility]  Staff Pulmonologist and Intensivist  [facility]     Please note that this dictation was created using voice recognition software. The accuracy of the dictation is limited to the abilities of the software. I have made every reasonable attempt to correct obvious errors, but I expect that there are errors of grammar and possibly content that I did not discover before finalizing the note.

## 2025-02-05 ENCOUNTER — OFFICE VISIT (OUTPATIENT)
Dept: NEUROLOGY | Facility: MEDICAL CENTER | Age: 80
End: 2025-02-05
Attending: SPECIALIST
Payer: MEDICARE

## 2025-02-05 VITALS
OXYGEN SATURATION: 93 % | BODY MASS INDEX: 18.48 KG/M2 | HEIGHT: 72 IN | TEMPERATURE: 97.3 F | SYSTOLIC BLOOD PRESSURE: 122 MMHG | WEIGHT: 136.47 LBS | DIASTOLIC BLOOD PRESSURE: 58 MMHG | HEART RATE: 92 BPM

## 2025-02-05 DIAGNOSIS — M54.16 LUMBAR RADICULOPATHY: ICD-10-CM

## 2025-02-05 DIAGNOSIS — G57.12 MERALGIA PARESTHETICA OF LEFT SIDE: ICD-10-CM

## 2025-02-05 PROCEDURE — 3074F SYST BP LT 130 MM HG: CPT | Performed by: SPECIALIST

## 2025-02-05 PROCEDURE — 99212 OFFICE O/P EST SF 10 MIN: CPT | Performed by: SPECIALIST

## 2025-02-05 PROCEDURE — 3078F DIAST BP <80 MM HG: CPT | Performed by: SPECIALIST

## 2025-02-05 PROCEDURE — 99204 OFFICE O/P NEW MOD 45 MIN: CPT | Performed by: SPECIALIST

## 2025-02-05 RX ORDER — GABAPENTIN 100 MG/1
100 CAPSULE ORAL 3 TIMES DAILY
Qty: 90 CAPSULE | Refills: 11 | Status: SHIPPED | OUTPATIENT
Start: 2025-02-05

## 2025-02-05 ASSESSMENT — FIBROSIS 4 INDEX: FIB4 SCORE: 1.39

## 2025-02-05 ASSESSMENT — PATIENT HEALTH QUESTIONNAIRE - PHQ9: CLINICAL INTERPRETATION OF PHQ2 SCORE: 0

## 2025-02-05 NOTE — PROGRESS NOTES
Subjective     Pérez Soto is a 79 y.o. male who presents with New Patient (DX: Radiculopathy, lumbar region )            HPI  Mr. Pérez soto is an 79-year-old gentleman referred by Dr. Ulises Hess.  He comes in with his wife who provides some of the history.  He recently moved here from [facility].  Approximately 15 years ago he developed pain radiating down the left leg from the buttocks to the foot.  He underwent a lumbar decompression and initially did get better but had steroid injections and the pain eventually went away.  The pain recurred several months ago but is located from the inguinal region to the lateral thigh but not below the knee.  He has had injections in his lumbar region without improvement.  He does have degenerative changes.  In addition he has numbness in the right distal leg laterally.  He has no weakness.  He has had chronic midline low back pain as well.  He has had 2 steroid injections in his back without improvement.  He was referred here ostensibly for an EMG.  I called him today and advised him that he is scheduled for an office visit but he wanted to come in anyway as he wanted another opinion.    Past medical history:    1.  Lumbar laminectomy 15 years ago as above.    2.  Left lateral thigh pain.    3.  Right lateral distal lower extremity pain.    Medications-Lipitor 40 mg daily, Questran 4 mg daily, as needed Pepcid, tramadol 50 mg 1 or 2 daily.    Allergies-NKDA    Social history-he does not smoke or drink.  He is a retired clinical psychologist.  PHQ-9 Depression Screening      Frequency of the following problems over the past two weeks:  Little interest or pleasure in doing things  0 - not at all  1 - several days  2 - more than half the days  3 - nearly every day  Feeling down, depressed, or hopeless  0 - not at all  1 - several days  2 - more than half the days  3 - nearly every day  PHQ-2 Score:  PHQ-9 Score:      ROS  As above, he reports midline low back pain  and numbness in his left lateral thigh and right lateral distal lower extremity.         Objective     /58 (BP Location: Right arm, Patient Position: Sitting, BP Cuff Size: Adult)   Pulse 92   Temp 36.3 °C (97.3 °F) (Temporal)   Ht 1.829 m (6')   Wt 61.9 kg (136 lb 7.4 oz)   SpO2 93%   BMI 18.51 kg/m²      Physical Exam  Patient is a cooperative gentleman who is alert.  Speech is fluent and mental status is grossly intact.  HEENT exam reveals him to be normocephalic and atraumatic.  Pupils are equal round reactive.  EOMs are full visual fields are full.    His neck is supple.            Neurological Exam  He stands without difficulty.  He has no drift and no dysmetria.  Rapid altering movements of the hands are symmetric.    Motor testing reveals intact bulk tone and strength throughout.    On sensory testing he has decreased pin sensation over the left lateral thigh not extending below the knee and over the distal right lower extremity laterally.    Reflexes are symmetric and normal active, toes are downgoing.    Cranial nerves are unremarkable.           Assessment & Plan   Mr. Pérez Soto is an 79-year-old gentleman who had lumbar radiculopathy 15 years ago with pain that radiated from the lumbar region to the foot on the left side.  This eventually improved with injections.  He is now noted recurrent symptoms but only in the lateral thigh.  He has intact sensation below that.  He may well have meralgia paresthetica.  His symptoms on the right probably represent a peroneal neuropathy.  He repetitively crossed his right leg over the left during the exam.  He is referred for nerve conduction studies.  I will see him back after that.  In addition he is prescribed gabapentin 100 mg tablets to start with 1 at night for 2 weeks then twice daily for 2 weeks then 3 times daily.  He is advised of the risk of sedation and the increased risk of falls in an 79-year-old.  I will see him back either for the EMG are  in 2 months.     Assessment & Plan  Lumbar radiculopathy    Orders:    gabapentin (NEURONTIN) 100 MG Cap; Take 1 Capsule by mouth 3 times a day.    Referral to Neurodiagnostics (EEG,EP,EMG/NCS/DBS)    Meralgia paresthetica of left side

## 2025-02-07 ENCOUNTER — HOSPITAL ENCOUNTER (OUTPATIENT)
Facility: MEDICAL CENTER | Age: 80
End: 2025-02-07
Attending: INTERNAL MEDICINE
Payer: MEDICARE

## 2025-02-07 DIAGNOSIS — J84.10 GRANULOMATOUS LUNG DISEASE (HCC): ICD-10-CM

## 2025-02-07 DIAGNOSIS — J21.9 BRONCHIOLITIS: ICD-10-CM

## 2025-02-07 DIAGNOSIS — J94.8 PLEURAL CALCIFICATION: ICD-10-CM

## 2025-02-07 DIAGNOSIS — R63.4 WEIGHT LOSS: ICD-10-CM

## 2025-02-07 DIAGNOSIS — R53.82 CHRONIC FATIGUE: ICD-10-CM

## 2025-02-07 DIAGNOSIS — I34.0 MODERATE MITRAL REGURGITATION: ICD-10-CM

## 2025-02-07 LAB
BASOPHILS # BLD AUTO: 0.4 % (ref 0–1.8)
BASOPHILS # BLD: 0.04 K/UL (ref 0–0.12)
CHOLEST SERPL-MCNC: 150 MG/DL (ref 100–199)
EOSINOPHIL # BLD AUTO: 0.01 K/UL (ref 0–0.51)
EOSINOPHIL NFR BLD: 0.1 % (ref 0–6.9)
ERYTHROCYTE [DISTWIDTH] IN BLOOD BY AUTOMATED COUNT: 49.5 FL (ref 35.9–50)
FASTING STATUS PATIENT QL REPORTED: NORMAL
HCT VFR BLD AUTO: 47.7 % (ref 42–52)
HDLC SERPL-MCNC: 57 MG/DL
HGB BLD-MCNC: 15.6 G/DL (ref 14–18)
IMM GRANULOCYTES # BLD AUTO: 0.06 K/UL (ref 0–0.11)
IMM GRANULOCYTES NFR BLD AUTO: 0.5 % (ref 0–0.9)
LDLC SERPL CALC-MCNC: 78 MG/DL
LYMPHOCYTES # BLD AUTO: 0.96 K/UL (ref 1–4.8)
LYMPHOCYTES NFR BLD: 8.5 % (ref 22–41)
MCH RBC QN AUTO: 32.2 PG (ref 27–33)
MCHC RBC AUTO-ENTMCNC: 32.7 G/DL (ref 32.3–36.5)
MCV RBC AUTO: 98.6 FL (ref 81.4–97.8)
MONOCYTES # BLD AUTO: 0.93 K/UL (ref 0–0.85)
MONOCYTES NFR BLD AUTO: 8.2 % (ref 0–13.4)
NEUTROPHILS # BLD AUTO: 9.36 K/UL (ref 1.82–7.42)
NEUTROPHILS NFR BLD: 82.3 % (ref 44–72)
NRBC # BLD AUTO: 0 K/UL
NRBC BLD-RTO: 0 /100 WBC (ref 0–0.2)
PLATELET # BLD AUTO: 318 K/UL (ref 164–446)
PMV BLD AUTO: 9.7 FL (ref 9–12.9)
RBC # BLD AUTO: 4.84 M/UL (ref 4.7–6.1)
TRIGL SERPL-MCNC: 76 MG/DL (ref 0–149)
WBC # BLD AUTO: 11.4 K/UL (ref 4.8–10.8)

## 2025-02-07 PROCEDURE — 86200 CCP ANTIBODY: CPT

## 2025-02-07 PROCEDURE — 82784 ASSAY IGA/IGD/IGG/IGM EACH: CPT

## 2025-02-07 PROCEDURE — 36415 COLL VENOUS BLD VENIPUNCTURE: CPT

## 2025-02-07 PROCEDURE — 86480 TB TEST CELL IMMUN MEASURE: CPT

## 2025-02-07 PROCEDURE — 86635 COCCIDIOIDES ANTIBODY: CPT | Mod: 91

## 2025-02-07 PROCEDURE — 85025 COMPLETE CBC W/AUTO DIFF WBC: CPT

## 2025-02-07 PROCEDURE — 86698 HISTOPLASMA ANTIBODY: CPT

## 2025-02-07 PROCEDURE — 83516 IMMUNOASSAY NONANTIBODY: CPT

## 2025-02-07 PROCEDURE — 86431 RHEUMATOID FACTOR QUANT: CPT

## 2025-02-07 PROCEDURE — 80061 LIPID PANEL: CPT

## 2025-02-07 PROCEDURE — 84305 ASSAY OF SOMATOMEDIN: CPT

## 2025-02-09 LAB
IGA SERPL-MCNC: 308 MG/DL (ref 68–408)
IGG SERPL-MCNC: 774 MG/DL (ref 768–1632)
IGM SERPL-MCNC: 84 MG/DL (ref 35–263)

## 2025-02-10 DIAGNOSIS — R53.82 CHRONIC FATIGUE: ICD-10-CM

## 2025-02-10 DIAGNOSIS — R63.4 WEIGHT LOSS: ICD-10-CM

## 2025-02-10 LAB
CARBAMYLATED PROTEIN ANTIBODY, IGG Q6043: 10 UNITS (ref 0–19)
CCP IGA+IGG SERPL IA-ACNC: 16 UNITS (ref 0–19)
GAMMA INTERFERON BACKGROUND BLD IA-ACNC: 0.04 IU/ML
IGF-I SERPL-MCNC: 124 NG/ML (ref 17–180)
IGF-I Z-SCORE SERPL: 0.7
M TB IFN-G BLD-IMP: NEGATIVE
M TB IFN-G CD4+ BCKGRND COR BLD-ACNC: -0.02 IU/ML
MITOGEN IGNF BCKGRD COR BLD-ACNC: 8.52 IU/ML
QFT TB2 - NIL TBQ2: -0.01 IU/ML
RHEUMATOID FACT SER NEPH-ACNC: <10 IU/ML (ref 0–14)

## 2025-02-12 ENCOUNTER — HOSPITAL ENCOUNTER (OUTPATIENT)
Facility: MEDICAL CENTER | Age: 80
End: 2025-02-12
Attending: INTERNAL MEDICINE
Payer: MEDICARE

## 2025-02-12 LAB
C IMMITIS AB SER QL ID: NOT DETECTED
COCCIDIOIDES AB TITR SER CF: NORMAL {TITER}
COCCIDIOIDES IGG SER-ACNC: 0.8 IV
COCCIDIOIDES IGM SERPL-ACNC: 0.1 IV
CORTIS SERPL-MCNC: 8.5 UG/DL (ref 0–23)
H CAPSUL MYC AB TITR SER CF: NORMAL {TITER}
H CAPSUL YST AB TITR SER CF: NORMAL {TITER}

## 2025-02-12 PROCEDURE — 82533 TOTAL CORTISOL: CPT

## 2025-02-12 PROCEDURE — 84305 ASSAY OF SOMATOMEDIN: CPT

## 2025-02-12 PROCEDURE — 86812 HLA TYPING A B OR C: CPT | Mod: GA

## 2025-02-12 PROCEDURE — 36415 COLL VENOUS BLD VENIPUNCTURE: CPT

## 2025-02-14 ENCOUNTER — OFFICE VISIT (OUTPATIENT)
Dept: MEDICAL GROUP | Facility: MEDICAL CENTER | Age: 80
End: 2025-02-14
Payer: MEDICARE

## 2025-02-14 VITALS
OXYGEN SATURATION: 93 % | DIASTOLIC BLOOD PRESSURE: 50 MMHG | HEART RATE: 83 BPM | SYSTOLIC BLOOD PRESSURE: 102 MMHG | HEIGHT: 72 IN | TEMPERATURE: 98 F | WEIGHT: 135.4 LBS | BODY MASS INDEX: 18.34 KG/M2

## 2025-02-14 DIAGNOSIS — G89.29 CHRONIC BILATERAL LOW BACK PAIN WITH BILATERAL SCIATICA: ICD-10-CM

## 2025-02-14 DIAGNOSIS — Z97.4 USES HEARING AID: ICD-10-CM

## 2025-02-14 DIAGNOSIS — H61.22 HEARING LOSS DUE TO CERUMEN IMPACTION, LEFT: ICD-10-CM

## 2025-02-14 DIAGNOSIS — M67.431 GANGLION CYST OF WRIST, RIGHT: ICD-10-CM

## 2025-02-14 DIAGNOSIS — M54.42 CHRONIC BILATERAL LOW BACK PAIN WITH BILATERAL SCIATICA: ICD-10-CM

## 2025-02-14 DIAGNOSIS — M54.41 CHRONIC BILATERAL LOW BACK PAIN WITH BILATERAL SCIATICA: ICD-10-CM

## 2025-02-14 LAB
HLA-B27 QL FC: NEGATIVE
IGF-I SERPL-MCNC: 148 NG/ML (ref 17–180)
IGF-I Z-SCORE SERPL: 1

## 2025-02-14 PROCEDURE — 99214 OFFICE O/P EST MOD 30 MIN: CPT | Performed by: PHYSICIAN ASSISTANT

## 2025-02-14 PROCEDURE — 3074F SYST BP LT 130 MM HG: CPT | Performed by: PHYSICIAN ASSISTANT

## 2025-02-14 PROCEDURE — 3078F DIAST BP <80 MM HG: CPT | Performed by: PHYSICIAN ASSISTANT

## 2025-02-14 ASSESSMENT — FIBROSIS 4 INDEX: FIB4 SCORE: 1.01

## 2025-02-14 NOTE — PROGRESS NOTES
Subjective:     History of Present Illness  The patient presents for evaluation of cerumen impaction, back pain, ganglion cyst, and review of CBC results.    He has been experiencing cerumen impaction in his left ear, a condition that has persisted for several years. He recalls a previous incident where an attempt to irrigate the ear at an emergency care facility resulted in discomfort and inflammation due to the dryness of the wax. He is scheduled for a new hearing aid fitting at [facility] and requires a clean ear canal prior to the appointment.    He has expressed interest in understanding the results of his recent complete blood count (CBC) test, which was ordered by his pulmonologist, Dr. Shoemaker. He reports no recent illness. He has a lab appointment scheduled for the upcoming week to complete any pending tests.    He continues to experience significant back pain, which he rates as an 8 on a scale of 10. The pain is so severe that it limits his ability to walk through a store without exacerbating the discomfort. He has been prescribed tramadol, with a recommended dosage of three times daily, but he has been hesitant to increase his intake from two to three tablets per day. He has received two steroid injections in the past, but these have not provided any relief. He is currently under consideration for neurosurgery, specifically the implantation of a device with electrodes to block nerve signals and manage the pain. He has an upcoming EMG test scheduled to evaluate the pain radiating down his legs.    He has a history of ganglion cyst, having had the same one lanced three times in the past. He is considering additional lancing procedures for a current cyst that has begun to cause discomfort.    MEDICATIONS  tramadol      Current medicines (including changes today)  Current Outpatient Medications   Medication Sig Dispense Refill    gabapentin (NEURONTIN) 100 MG Cap Take 1 Capsule by mouth 3 times a day. 90 Capsule 11     traMADol (ULTRAM) 50 MG Tab TAKE 1 TABLET BY MOUTH THREE TIMES A DAY AS NEEDED FOR 30 DAYS      ASPIRIN 81 PO Take  by mouth.      Coenzyme Q10 (Q-10 CO-ENZYME PO) Take  by mouth.      Cholecalciferol (D3) 1000 UNIT Cap Take  by mouth.      Omega-3 Fatty Acids (OMEGA 3 PO) Take 1,280 mg by mouth in the morning, at noon, and at bedtime.      atorvastatin (LIPITOR) 40 MG Tab Take 1 Tablet by mouth every evening. 100 Tablet 4    tadalafil (CIALIS) 5 MG tablet Take 1 Tablet by mouth every day for 360 days. 90 Tablet 3    finasteride (PROSCAR) 5 MG Tab Take 1 Tablet by mouth every day. 90 Tablet 3    famotidine (PEPCID) 40 MG Tab Take 40 mg by mouth every day.      cholestyramine (QUESTRAN) 4 g packet Take 1 Packet by mouth every day.      cycloSPORINE (RESTASIS) 0.05 % ophthalmic emulsion Administer 1 Drop into both eyes 2 times a day.       No current facility-administered medications for this visit.     He  has a past medical history of Cough.    He has no past medical history of Painful breathing, Shortness of breath, or Wheezing.    ROS   No chest pain, no shortness of breath, no abdominal pain  Positive ROS as per HPI.  All other systems reviewed and are negative.     Objective:     /50 (BP Location: Left arm, Patient Position: Sitting, BP Cuff Size: Adult)   Pulse 83   Temp 36.7 °C (98 °F) (Temporal)   Ht 1.829 m (6')   Wt 61.4 kg (135 lb 6.4 oz)   SpO2 93%  Body mass index is 18.36 kg/m².   Physical Exam  There is a lot of wax in the left ear.    Vital Signs  Weight is 135.  Constitutional: Alert, no distress.  Skin: Warm, dry, good turgor, no rashes in visible areas.  Eye: Equal, round and reactive, conjunctiva clear, lids normal.  ENMT: Lips without lesions, good dentition, oropharynx clear.  Neck: Trachea midline, no masses, no thyromegaly.   Psych: Alert and oriented x3, normal affect and mood.      Results  Laboratory Studies  White blood cell count was mildly elevated. Cortisol level was  good.        Assessment and Plan:   The following treatment plan was discussed    Assessment & Plan  1. Cerumen impaction.  The left ear is obstructed with dry cerumen. He is advised to use Debrox, an over-the-counter earwax softening agent, followed by a 1:1 solution of hydrogen peroxide and warm water for irrigation. If unsuccessful, he should return for professional ear irrigation.    2. Chronic back pain.  He reports persistent back pain, rated at 8/10, exacerbated by walking. Previous steroid injections were ineffective. He is currently taking tramadol twice daily. He is advised to increase the dosage to three times daily. An EMG has been scheduled to evaluate pain radiating to his legs. Neurosurgery consultation for potential spinal cord stimulator implantation is pending. Continue to follow with [facility] neurosurgery.    3. Ganglion cyst of left wrist.  He has a recurrent ganglion cyst that has been lanced multiple times. He is advised that surgical removal is the only definitive treatment to prevent recurrence.    4. Elevated white blood cell count.  His recent CBC showed a mildly elevated white blood cell count. He reports no recent illness. He is advised to follow up with Dr. Lu, his pulmonologist, for further evaluation.    5. Health maintenance.  A PSA test will be ordered during his next visit.      ORDERS:  1. Uses hearing aid      2. Hearing loss due to cerumen impaction, left      3. Chronic bilateral low back pain with bilateral sciatica      4. Ganglion cyst of wrist, right          Please note that this dictation was created using voice recognition software. I have made every reasonable attempt to correct obvious errors, but I expect that there are errors of grammar and possibly content that I did not discover before finalizing the note.      Attestation      Verbal consent was acquired by the patient to use [employer] ambient listening note generation during this visit Yes

## 2025-02-16 ENCOUNTER — TELEPHONE (OUTPATIENT)
Dept: ENDOCRINOLOGY | Facility: MEDICAL CENTER | Age: 80
End: 2025-02-16
Payer: MEDICARE

## 2025-02-16 NOTE — TELEPHONE ENCOUNTER
Please let Mr Soto know that his endocrine labs  (testing his adrenal axis and his growthhormone axis) both looked ok, so I don't see an endocrine cause for his weight loss at this time

## 2025-03-01 ENCOUNTER — HOSPITAL ENCOUNTER (OUTPATIENT)
Facility: MEDICAL CENTER | Age: 80
End: 2025-03-01
Attending: INTERNAL MEDICINE
Payer: MEDICARE

## 2025-03-01 DIAGNOSIS — R63.4 WEIGHT LOSS: ICD-10-CM

## 2025-03-01 DIAGNOSIS — R53.82 CHRONIC FATIGUE: ICD-10-CM

## 2025-03-01 LAB — CORTIS SERPL-MCNC: 17.6 UG/DL (ref 0–23)

## 2025-03-01 PROCEDURE — 82024 ASSAY OF ACTH: CPT

## 2025-03-01 PROCEDURE — 36415 COLL VENOUS BLD VENIPUNCTURE: CPT

## 2025-03-01 PROCEDURE — 82533 TOTAL CORTISOL: CPT

## 2025-03-03 ENCOUNTER — TELEPHONE (OUTPATIENT)
Dept: ENDOCRINOLOGY | Facility: MEDICAL CENTER | Age: 80
End: 2025-03-03
Payer: MEDICARE

## 2025-03-03 LAB — ACTH PLAS-MCNC: 3.9 PG/ML (ref 7.2–63.3)

## 2025-03-04 NOTE — TELEPHONE ENCOUNTER
Pls let pt know that the endocrine labs he has recently had drawn (to test his adrenal axis and, previously, his growth hormone axis) all look fine, so I don't see an endocrine cause for his weight loss at this time

## 2025-03-05 ENCOUNTER — PATIENT MESSAGE (OUTPATIENT)
Dept: ENDOCRINOLOGY | Facility: MEDICAL CENTER | Age: 80
End: 2025-03-05
Payer: MEDICARE

## 2025-03-26 ENCOUNTER — HOSPITAL ENCOUNTER (OUTPATIENT)
Facility: MEDICAL CENTER | Age: 80
End: 2025-03-26
Attending: STUDENT IN AN ORGANIZED HEALTH CARE EDUCATION/TRAINING PROGRAM
Payer: MEDICARE

## 2025-03-26 LAB
25(OH)D3 SERPL-MCNC: 37 NG/ML (ref 30–100)
ALBUMIN SERPL BCP-MCNC: 3.1 G/DL (ref 3.2–4.9)
ALBUMIN/GLOB SERPL: 0.9 G/DL
ALP SERPL-CCNC: 81 U/L (ref 30–99)
ALT SERPL-CCNC: 27 U/L (ref 2–50)
ANION GAP SERPL CALC-SCNC: 11 MMOL/L (ref 7–16)
AST SERPL-CCNC: 24 U/L (ref 12–45)
BILIRUB SERPL-MCNC: 0.9 MG/DL (ref 0.1–1.5)
BUN SERPL-MCNC: 23 MG/DL (ref 8–22)
CALCIUM ALBUM COR SERPL-MCNC: 9.6 MG/DL (ref 8.5–10.5)
CALCIUM SERPL-MCNC: 8.9 MG/DL (ref 8.4–10.2)
CHLORIDE SERPL-SCNC: 99 MMOL/L (ref 96–112)
CO2 SERPL-SCNC: 26 MMOL/L (ref 20–33)
CREAT SERPL-MCNC: 0.56 MG/DL (ref 0.5–1.4)
CRP SERPL HS-MCNC: 5.82 MG/DL (ref 0–0.75)
ERYTHROCYTE [SEDIMENTATION RATE] IN BLOOD BY WESTERGREN METHOD: 40 MM/HOUR (ref 0–20)
FASTING STATUS PATIENT QL REPORTED: NORMAL
GFR SERPLBLD CREATININE-BSD FMLA CKD-EPI: 99 ML/MIN/1.73 M 2
GLOBULIN SER CALC-MCNC: 3.4 G/DL (ref 1.9–3.5)
GLUCOSE SERPL-MCNC: 147 MG/DL (ref 65–99)
POTASSIUM SERPL-SCNC: 3.7 MMOL/L (ref 3.6–5.5)
PROT SERPL-MCNC: 6.5 G/DL (ref 6–8.2)
SODIUM SERPL-SCNC: 136 MMOL/L (ref 135–145)
T4 FREE SERPL-MCNC: 1.3 NG/DL (ref 0.93–1.7)
TSH SERPL DL<=0.005 MIU/L-ACNC: 1.85 UIU/ML (ref 0.38–5.33)
VIT B12 SERPL-MCNC: 1139 PG/ML (ref 211–911)

## 2025-03-26 PROCEDURE — 84402 ASSAY OF FREE TESTOSTERONE: CPT

## 2025-03-26 PROCEDURE — 82607 VITAMIN B-12: CPT

## 2025-03-26 PROCEDURE — 84439 ASSAY OF FREE THYROXINE: CPT

## 2025-03-26 PROCEDURE — 84443 ASSAY THYROID STIM HORMONE: CPT

## 2025-03-26 PROCEDURE — 36415 COLL VENOUS BLD VENIPUNCTURE: CPT

## 2025-03-26 PROCEDURE — 80053 COMPREHEN METABOLIC PANEL: CPT

## 2025-03-26 PROCEDURE — 84270 ASSAY OF SEX HORMONE GLOBUL: CPT

## 2025-03-26 PROCEDURE — 85652 RBC SED RATE AUTOMATED: CPT

## 2025-03-26 PROCEDURE — 86140 C-REACTIVE PROTEIN: CPT

## 2025-03-26 PROCEDURE — 84403 ASSAY OF TOTAL TESTOSTERONE: CPT

## 2025-03-26 PROCEDURE — 82306 VITAMIN D 25 HYDROXY: CPT

## 2025-03-28 LAB
SHBG SERPL-SCNC: 75 NMOL/L (ref 19–76)
TESTOST FREE MFR SERPL: 1.1 % (ref 1.6–2.9)
TESTOST FREE SERPL-MCNC: 39 PG/ML (ref 47–244)
TESTOST SERPL-MCNC: 364 NG/DL (ref 300–720)

## 2025-03-31 ENCOUNTER — OFFICE VISIT (OUTPATIENT)
Dept: NEUROLOGY | Facility: MEDICAL CENTER | Age: 80
End: 2025-03-31
Attending: STUDENT IN AN ORGANIZED HEALTH CARE EDUCATION/TRAINING PROGRAM
Payer: MEDICARE

## 2025-03-31 VITALS
WEIGHT: 130.29 LBS | SYSTOLIC BLOOD PRESSURE: 122 MMHG | DIASTOLIC BLOOD PRESSURE: 70 MMHG | TEMPERATURE: 98.4 F | BODY MASS INDEX: 18.65 KG/M2 | HEART RATE: 92 BPM | HEIGHT: 70 IN | OXYGEN SATURATION: 94 % | RESPIRATION RATE: 12 BRPM

## 2025-03-31 DIAGNOSIS — M48.061 SPINAL STENOSIS OF LUMBAR REGION, UNSPECIFIED WHETHER NEUROGENIC CLAUDICATION PRESENT: ICD-10-CM

## 2025-03-31 DIAGNOSIS — M54.16 LUMBAR RADICULOPATHY: ICD-10-CM

## 2025-03-31 PROCEDURE — 95911 NRV CNDJ TEST 9-10 STUDIES: CPT

## 2025-03-31 PROCEDURE — 95886 MUSC TEST DONE W/N TEST COMP: CPT

## 2025-03-31 ASSESSMENT — FIBROSIS 4 INDEX: FIB4 SCORE: 1.18

## 2025-04-01 ENCOUNTER — APPOINTMENT (OUTPATIENT)
Dept: NEUROLOGY | Facility: MEDICAL CENTER | Age: 80
End: 2025-04-01
Attending: STUDENT IN AN ORGANIZED HEALTH CARE EDUCATION/TRAINING PROGRAM
Payer: MEDICARE

## 2025-04-01 NOTE — PROGRESS NOTES
"Electromyography Report          Name: Clem Soto    MRN: [id number]   YOB: 1945 (80 y.o.)   Sex: male   Vitals:  Vitals:    03/31/25 1036   BP: 122/70   Weight: 59.1 kg (130 lb 4.7 oz)   Height: 1.778 m (5' 10\")     Body mass index is 18.69 kg/m².    Examining Physician: Joel Silverman D.O.     Visit Diagnoses     ICD-10-CM   1. Lumbar radiculopathy [M54.16]  M54.16   2. Spinal stenosis of lumbar region, unspecified whether neurogenic claudication present [M48.061]  M48.061       EMG Examination Date: 3/31/2025     Impression:      This is an abnormal study.   There is electrodiagnostic evidence of chronic bilateral L2-S2 polyradiculopathies. There is evidence of active denervation in the bilateral S2 region.   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   There is clinical evidence of spinal cord involvement given brisk reflexes, and upgoing babinski. He may also have neurogenic bladder, although difficult to assess due to history of enlarged prostate.     Note: the patient's pain radiation pattern is more consistent with T12-L1 radiculopathy, but these levels were not assessed in this exam.       Recommendations: MRI imaging of the spine, if not already completed, and evaluation by neurosurgery.       History: Clem Soto is a 80 y.o. male who presents for evaluation of low back pain with radiation into his bilateral legs.  The patient has a history of lumbar laminectomy 10 to 15 years ago.  At that time, he was having back pain with radiation into his bilateral legs.  After surgery, his symptoms improved significantly.  However, for the last 8 to 10 years he has had slowly progressive worsening of his back pain.  Now, it radiates around to the front of his hips/groin, but does not radiate lower into his legs or feet.  He sometimes has numbness on the dorsum of his bilateral feet, but otherwise denies numbness or tingling in " his legs.  He has a history of enlarged prostate with need for frequent urination.  Has a history of long COVID, and states that since getting COVID he has lost weight, and has poor strength and endurance.  He states that overall he feels very skinny and weak. +shopping cart sign.     Social: Denies tobacco, vaping, or recreational drug use.  Drinks 1 beer every 2 to 3 weeks.  Retired psychologist.    Physical Exam:  General: Not in distress.  Elderly male, walks with mildly stooped posture and flattened lumbar lordosis.  HEENT: PERRL. EOMI.  Pulmonary: Breathing comfortably on RA  Cardiovascular: normal rate  Integumentary: Visible skin intact.  Psychiatric: Normal affect.  Neurologic: Alert and oriented. Answering questions appropriately. Follows commands. Normal speech.     Lumbar ROM: Reduced in all planes, especially lumbar extension.  Lumbar facet loading: Positive bilaterally  Hip ROM: normal bilaterally  SLR:  negative bilaterally  Slump: negative bilaterally  SI distraction:  negative bilaterally  HEDY: negative bilaterally  FADIR: negative bilaterally    MMT:  RLE: HF 5/5, Hip abd 5/5, KF 5/5, KE 5/5, ADF 5/5, APF 5/5, Inversion 5/5  LLE: HF 5/5, Hip abd 5/5, KF 5/5, KE 5/5, ADF 5/5, APF 5/5, Inversion 5/5    Sensory: intact to light touch throughout bilateral lower extremities.    DTRs:  Right KJ 3+, AJ 2+  Left KJ 3+, AJ 2+  Clonus: 1-2 beat bilateral ankles  Babinski: left toe upgoing, right flat.     Nerve Conduction Studies and Electromyography  Examination Findings:       Nerve Conduction Studies     Stim Site NR Onset (ms) Norm Onset (ms) O-P Amp (µV) Norm O-P Amp Site1 Site2 Delta-P (ms) Dist (cm) Cedric (m/s) Norm Cedric (m/s)   Left Sup Fibular Anti Sensory (Ant Lat Mall)   14 cm *NR    >5.0 14 cm Ant Lat Mall  14.0  >32   Right Sup Fibular Anti Sensory (Ant Lat Mall)   14 cm *NR    >5.0 14 cm Ant Lat Mall  14.0  >32   Left Sural Anti Sensory (Lat Mall)   Calf *NR  <4.6  >3 Calf Lat Mall  14.0  >40    Right Sural Anti Sensory (Lat Mall)   Calf *NR  <4.6  >3 Calf Lat Mall  14.0  >40        Stim Site NR Onset (ms) Norm Onset (ms) O-P Amp (mV) Norm O-P Amp Site1 Site2 Delta-0 (ms) Dist (cm) Cedric (m/s) Norm Cedric (m/s)   Left Peroneal EDB Motor (Ext Dig Brev)   Ankle    *7.3 <6 *2.3 >2.5 B Fib Ankle 8.5 32.0 *38 >40   B Fib    15.8  1.4  Poplt B Fib 3.0 13.0 43    Poplt    18.8  1.2          Right Peroneal EDB Motor (Ext Dig Brev)   Ankle    5.0 <6 *2.2 >2.5 B Fib Ankle 8.2 33.0 40 >40   B Fib    13.2  1.4  Poplt B Fib 3.1 13.0 42    Poplt    16.3  1.6          Left Tibial Motor (Abd Montesinos Brev)   Ankle    5.7 <6 *1.8 >4 Knee Ankle 9.1 38.0 42 >40   Knee    14.8  1.5          Right Tibial Motor (Abd Montesinos Brev)   Ankle    5.0 <6 *1.8 >4 Knee Ankle 9.2 36.5 40 >40   Knee    14.2  2.0            F Wave Studies     NR F-Lat (ms) Lat Norm (ms)   Left Peroneal EDB (Ext Dig Brev)      27.97 <57   Right Peroneal EDB (Ext Dig Brev)      *60.42 <57   Left Tibial (Abd Hallucis)      *71.73 <57   Right Tibial (Abd Hallucis)      55.83 <57     H Reflex Studies     NR H-Lat (ms) L-R H-Lat (ms) L-R Lat Norm   Left Tibial (Gastroc)      34.37 *3.12 <2.0   Right Tibial (Gastroc)      31.25 *3.12 <2.0                                           Electromyography     Side Muscle Nerve Root Ins Act Fibs Psw Amp Dur Poly Recrt Int Pat Comment   Right VastusLat Femoral L2-4 Nml Nml Nml *Incr Nml 0 Nml Nml    Right AntTibialis Dp Br Fibular L4-5 Nml Nml Nml *Incr *>12ms *1+ *Reduced Nml    Right Gastroc Tibial S1-2 *Incr *2+ *2+ *Incr Nml 0 Nml Nml    Right Fibularis Long Sup Br Fibular L5-S1 Nml Nml Nml *Incr *>12ms *1+ *Reduced Nml    Right GluteusMed SupGluteal L5-S1 Nml Nml Nml Nml Nml 0 Nml Nml    Left VastusLat Femoral L2-4 Nml Nml Nml *Incr Nml 0 Nml Nml    Left AntTibialis Dp Br Fibular L4-5 Nml Nml Nml *Incr Nml 0 *Reduced Nml    Left Fibularis Long Sup Br Fibular L5-S1 Nml Nml Nml *Incr *>12ms *2+ Nml Nml    Left Gastroc Medial Head  Tibial S1-2 Nml *1+ *1+ Nml Nml 0 Nml Nml    Left GluteusMed SupGluteal L5-S1 Nml Nml Nml Nml Nml 0 Nml Nml          cpt 79921. Nerve conduction studies, 9-10 studies  CPT 95886 x 2 . needle electromyography, complete, each extremity. two extremities    Joel Silverman D.O.  Physical Medicine & Rehabilitation

## 2025-04-07 ENCOUNTER — APPOINTMENT (OUTPATIENT)
Dept: NEUROLOGY | Facility: MEDICAL CENTER | Age: 80
End: 2025-04-07
Attending: SPECIALIST
Payer: MEDICARE

## 2025-04-14 DIAGNOSIS — R63.4 UNINTENTIONAL WEIGHT LOSS: ICD-10-CM

## 2025-04-14 DIAGNOSIS — R91.8 OTHER NONSPECIFIC ABNORMAL FINDING OF LUNG FIELD: ICD-10-CM

## 2025-04-14 NOTE — PROGRESS NOTES
Contacted by Dr Rico, PCP and case discussed    DR Rico recently seen patient who reports continued SOB and weight loss    See my clinic note from 1/2025 for details    CT 7/2024 is overall underwhelming, shows persistent reticulonodular infiltrates in RML, and at last clinic visit in 1/2025 patient reported no respiratory symptoms and weight stabilized, but now losing again.     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 This can cause weight loss but without respiratory symptoms would be unusual. However as we have no other explanation for weight loss the seems fair to conclude as dx of exclusion its from ESTER.     No sputum production per Dr. Rico, who is  requesting bronchoscopy and lavage which is reasonable.    Orders placed and schedulers informed. Repeat CT chest ordered for procedure planning    1. Other nonspecific abnormal finding of lung field  CT-CHEST (THORAX) W/O    Bronchoscopy      2. Unintentional weight loss  CT-CHEST (THORAX) W/O    Bronchoscopy

## 2025-04-15 ENCOUNTER — APPOINTMENT (OUTPATIENT)
Dept: ADMISSIONS | Facility: MEDICAL CENTER | Age: 80
End: 2025-04-15
Attending: INTERNAL MEDICINE
Payer: MEDICARE

## 2025-04-16 ENCOUNTER — PRE-ADMISSION TESTING (OUTPATIENT)
Dept: ADMISSIONS | Facility: MEDICAL CENTER | Age: 80
End: 2025-04-16
Attending: INTERNAL MEDICINE
Payer: MEDICARE

## 2025-04-16 VITALS — BODY MASS INDEX: 18.69 KG/M2 | HEIGHT: 70 IN

## 2025-04-16 DIAGNOSIS — Z01.812 PRE-OPERATIVE LABORATORY EXAMINATION: ICD-10-CM

## 2025-04-16 DIAGNOSIS — Z01.810 PRE-OPERATIVE CARDIOVASCULAR EXAMINATION: ICD-10-CM

## 2025-04-16 RX ORDER — ONDANSETRON 8 MG/1
TABLET, ORALLY DISINTEGRATING ORAL
COMMUNITY
Start: 2025-03-20

## 2025-04-16 RX ORDER — ALBUTEROL SULFATE 90 UG/1
INHALANT RESPIRATORY (INHALATION)
COMMUNITY
Start: 2025-04-11

## 2025-04-16 RX ORDER — MEGESTROL ACETATE 40 MG/ML
200 SUSPENSION ORAL DAILY
COMMUNITY

## 2025-04-16 NOTE — PREADMIT AVS NOTE
Current Medications   Medication Instructions    albuterol 108 (90 Base) MCG/ACT Aero Soln inhalation aerosol As needed medication, may take if needed, including morning of procedure     ondansetron (ZOFRAN ODT) 8 MG TABLET DISPERSIBLE As needed medication, may take if needed, including morning of procedure     megestrol (MEGACE) 40 MG/ML Suspension Hold medication day of procedure and Follow instructions from surgeon or specialist    gabapentin (NEURONTIN) 100 MG Cap As needed medication, may take if needed, including morning of procedure     traMADol (ULTRAM) 50 MG Tab As needed medication, may take if needed, including morning of procedure     ASPIRIN 81 PO Follow instructions from surgeon or specialist.    Coenzyme Q10 (Q-10 CO-ENZYME PO) Stop 7 days before surgery    Cholecalciferol (D3) 1000 UNIT Cap Stop 7 days before surgery    Omega-3 Fatty Acids (OMEGA 3 PO) Stop 7 days before surgery    atorvastatin (LIPITOR) 40 MG Tab Continue taking medication as prescribed, including morning of procedure (takes nightly)    tadalafil (CIALIS) 5 MG tablet Stop 48 hours before surgery    finasteride (PROSCAR) 5 MG Tab Continue taking medication as prescribed, including morning of procedure     famotidine (PEPCID) 40 MG Tab Continue taking medication as prescribed, including morning of procedure     cholestyramine (QUESTRAN) 4 g packet Stop 24 hours before surgery    cycloSPORINE (RESTASIS) 0.05 % ophthalmic emulsion As needed medication, may take if needed, including morning of procedure

## 2025-04-16 NOTE — PREPROCEDURE INSTRUCTIONS
PreAdmit Telephone Appointment: Reviewed the Preparing for your procedure handout with patient. Patient instructed per pharmacy guidelines regarding taking, holding or contacting provider for instructions on regularly prescribed medications before surgery.    Confirmed with patient where to check in morning of surgery.     Testing appointment 4/17.

## 2025-04-17 ENCOUNTER — APPOINTMENT (OUTPATIENT)
Dept: ADMISSIONS | Facility: MEDICAL CENTER | Age: 80
End: 2025-04-17
Attending: INTERNAL MEDICINE
Payer: MEDICARE

## 2025-04-17 DIAGNOSIS — Z01.812 PRE-OPERATIVE LABORATORY EXAMINATION: ICD-10-CM

## 2025-04-17 DIAGNOSIS — Z01.810 PRE-OPERATIVE CARDIOVASCULAR EXAMINATION: ICD-10-CM

## 2025-04-17 LAB
EKG IMPRESSION: NORMAL
ERYTHROCYTE [DISTWIDTH] IN BLOOD BY AUTOMATED COUNT: 50.2 FL (ref 35.9–50)
HCT VFR BLD AUTO: 45.6 % (ref 42–52)
HGB BLD-MCNC: 15 G/DL (ref 14–18)
MCH RBC QN AUTO: 31.3 PG (ref 27–33)
MCHC RBC AUTO-ENTMCNC: 32.9 G/DL (ref 32.3–36.5)
MCV RBC AUTO: 95.2 FL (ref 81.4–97.8)
PLATELET # BLD AUTO: 293 K/UL (ref 164–446)
PMV BLD AUTO: 8.9 FL (ref 9–12.9)
RBC # BLD AUTO: 4.79 M/UL (ref 4.7–6.1)
WBC # BLD AUTO: 10 K/UL (ref 4.8–10.8)

## 2025-04-17 PROCEDURE — 36415 COLL VENOUS BLD VENIPUNCTURE: CPT

## 2025-04-17 PROCEDURE — 93005 ELECTROCARDIOGRAM TRACING: CPT | Mod: TC

## 2025-04-17 PROCEDURE — 85027 COMPLETE CBC AUTOMATED: CPT | Mod: GA

## 2025-04-17 PROCEDURE — 93010 ELECTROCARDIOGRAM REPORT: CPT | Performed by: INTERNAL MEDICINE

## 2025-04-18 ENCOUNTER — HOSPITAL ENCOUNTER (OUTPATIENT)
Dept: RADIOLOGY | Facility: MEDICAL CENTER | Age: 80
End: 2025-04-18
Attending: INTERNAL MEDICINE
Payer: MEDICARE

## 2025-04-18 PROCEDURE — 71250 CT THORAX DX C-: CPT

## 2025-04-18 NOTE — Clinical Note
REFERRAL APPROVAL NOTICE         Sent on April 18, 2025                   Pérez Soto  [address] [place] [address]                   Dear Mr. Soto,    After a careful review of the medical information and benefit coverage, [facility] has processed your referral. See below for additional details.    If applicable, you must be actively enrolled with your insurance for coverage of the authorized service. If you have any questions regarding your coverage, please contact your insurance directly.    REFERRAL INFORMATION   Referral #:  [id number]  Referred-To Department    Referred-By Provider:  Pulmonary and Sleep Medicine    Clem Lu M.D.   Pulmonary/sleep [facility]      [address]  [address]  [place] [address] [phone] [address]  [place] [id number] [phone]    Referral Start Date:  04/14/2025  Referral End Date:   04/14/2026           SCHEDULING  If you do not already have an appointment, please call [phone] to make an appointment.   MORE INFORMATION  As a reminder, [facility] ownership has changed, meaning this location is now owned and operated by [facility]. As such, we want to clarify that our patients should expect to receive two separate bills for the services received at [facility] - one representing the [facility] facility fees as the owner of the establishment, and the other to represent the physician's services and subsequent fees. You can speak with your insurance carrier for a pricing estimate by calling the customer service number on the back of your card and ask about charges for a hospital outpatient visit.  If you do not already have a [employer] account, sign up at: [email or web address]  You can access your medical information, make appointments, see lab results, billing information,  and more.  If you have questions regarding this referral, please contact  the [facility] department at:             [phone]. Monday - Friday 7:30AM - 5:00PM.      Sincerely,  [facility]

## 2025-04-20 ENCOUNTER — RESULTS FOLLOW-UP (OUTPATIENT)
Dept: SLEEP MEDICINE | Facility: MEDICAL CENTER | Age: 80
End: 2025-04-20

## 2025-04-21 ENCOUNTER — ANESTHESIA (OUTPATIENT)
Dept: SURGERY | Facility: MEDICAL CENTER | Age: 80
End: 2025-04-21
Payer: MEDICARE

## 2025-04-21 ENCOUNTER — ANESTHESIA EVENT (OUTPATIENT)
Dept: SURGERY | Facility: MEDICAL CENTER | Age: 80
End: 2025-04-21
Payer: MEDICARE

## 2025-04-21 ENCOUNTER — HOSPITAL ENCOUNTER (OUTPATIENT)
Facility: MEDICAL CENTER | Age: 80
End: 2025-04-21
Attending: INTERNAL MEDICINE | Admitting: INTERNAL MEDICINE
Payer: MEDICARE

## 2025-04-21 VITALS
TEMPERATURE: 98.4 F | RESPIRATION RATE: 20 BRPM | OXYGEN SATURATION: 91 % | WEIGHT: 125.22 LBS | SYSTOLIC BLOOD PRESSURE: 126 MMHG | BODY MASS INDEX: 17.93 KG/M2 | DIASTOLIC BLOOD PRESSURE: 79 MMHG | HEART RATE: 81 BPM | HEIGHT: 70 IN

## 2025-04-21 LAB
APPEARANCE FLD: NORMAL
BODY FLD TYPE: NORMAL
COLOR FLD: NORMAL
EOSINOPHIL NFR FLD: 1 %
LYMPHOCYTES NFR FLD: 2 %
MONOS+MACROS NFR FLD MANUAL: 6 %
NEUTROPHILS NFR FLD: 91 %
PATHOLOGY CONSULT NOTE: NORMAL

## 2025-04-21 PROCEDURE — 160015 HCHG STAT PREOP MINUTES: Performed by: INTERNAL MEDICINE

## 2025-04-21 PROCEDURE — 87116 MYCOBACTERIA CULTURE: CPT

## 2025-04-21 PROCEDURE — 88112 CYTOPATH CELL ENHANCE TECH: CPT | Performed by: PATHOLOGY

## 2025-04-21 PROCEDURE — 160048 HCHG OR STATISTICAL LEVEL 1-5: Performed by: INTERNAL MEDICINE

## 2025-04-21 PROCEDURE — 700111 HCHG RX REV CODE 636 W/ 250 OVERRIDE (IP): Mod: JZ | Performed by: ANESTHESIOLOGY

## 2025-04-21 PROCEDURE — 88305 TISSUE EXAM BY PATHOLOGIST: CPT | Mod: 26 | Performed by: PATHOLOGY

## 2025-04-21 PROCEDURE — 88112 CYTOPATH CELL ENHANCE TECH: CPT | Mod: 26 | Performed by: PATHOLOGY

## 2025-04-21 PROCEDURE — 160025 RECOVERY II MINUTES (STATS): Performed by: INTERNAL MEDICINE

## 2025-04-21 PROCEDURE — 31624 DX BRONCHOSCOPE/LAVAGE: CPT | Performed by: INTERNAL MEDICINE

## 2025-04-21 PROCEDURE — 89240 UNLISTED MISC PATH TEST: CPT

## 2025-04-21 PROCEDURE — 700105 HCHG RX REV CODE 258: Performed by: INTERNAL MEDICINE

## 2025-04-21 PROCEDURE — 31645 BRNCHSC W/THER ASPIR 1ST: CPT | Performed by: INTERNAL MEDICINE

## 2025-04-21 PROCEDURE — 160041 HCHG SURGERY MINUTES - EA ADDL 1 MIN LEVEL 4: Performed by: INTERNAL MEDICINE

## 2025-04-21 PROCEDURE — 160009 HCHG ANES TIME/MIN: Performed by: INTERNAL MEDICINE

## 2025-04-21 PROCEDURE — 160002 HCHG RECOVERY MINUTES (STAT): Performed by: INTERNAL MEDICINE

## 2025-04-21 PROCEDURE — 88305 TISSUE EXAM BY PATHOLOGIST: CPT | Performed by: PATHOLOGY

## 2025-04-21 PROCEDURE — 87205 SMEAR GRAM STAIN: CPT | Mod: 91

## 2025-04-21 PROCEDURE — 700101 HCHG RX REV CODE 250: Performed by: ANESTHESIOLOGY

## 2025-04-21 PROCEDURE — 87070 CULTURE OTHR SPECIMN AEROBIC: CPT

## 2025-04-21 PROCEDURE — 87077 CULTURE AEROBIC IDENTIFY: CPT

## 2025-04-21 PROCEDURE — 160046 HCHG PACU - 1ST 60 MINS PHASE II: Performed by: INTERNAL MEDICINE

## 2025-04-21 PROCEDURE — 160029 HCHG SURGERY MINUTES - 1ST 30 MINS LEVEL 4: Performed by: INTERNAL MEDICINE

## 2025-04-21 PROCEDURE — 87102 FUNGUS ISOLATION CULTURE: CPT

## 2025-04-21 PROCEDURE — 87186 SC STD MICRODIL/AGAR DIL: CPT

## 2025-04-21 PROCEDURE — C1889 IMPLANT/INSERT DEVICE, NOC: HCPCS | Performed by: INTERNAL MEDICINE

## 2025-04-21 PROCEDURE — 160035 HCHG PACU - 1ST 60 MINS PHASE I: Performed by: INTERNAL MEDICINE

## 2025-04-21 RX ORDER — LIDOCAINE HYDROCHLORIDE 20 MG/ML
INJECTION, SOLUTION EPIDURAL; INFILTRATION; INTRACAUDAL; PERINEURAL PRN
Status: DISCONTINUED | OUTPATIENT
Start: 2025-04-21 | End: 2025-04-21 | Stop reason: SURG

## 2025-04-21 RX ORDER — HYDROCODONE BITARTRATE AND ACETAMINOPHEN 7.5; 325 MG/15ML; MG/15ML
30 SOLUTION ORAL
Status: DISCONTINUED | OUTPATIENT
Start: 2025-04-21 | End: 2025-04-21 | Stop reason: HOSPADM

## 2025-04-21 RX ORDER — SODIUM CHLORIDE, SODIUM LACTATE, POTASSIUM CHLORIDE, CALCIUM CHLORIDE 600; 310; 30; 20 MG/100ML; MG/100ML; MG/100ML; MG/100ML
INJECTION, SOLUTION INTRAVENOUS CONTINUOUS
Status: DISCONTINUED | OUTPATIENT
Start: 2025-04-21 | End: 2025-04-21 | Stop reason: HOSPADM

## 2025-04-21 RX ORDER — DIPHENHYDRAMINE HYDROCHLORIDE 50 MG/ML
12.5 INJECTION, SOLUTION INTRAMUSCULAR; INTRAVENOUS
Status: DISCONTINUED | OUTPATIENT
Start: 2025-04-21 | End: 2025-04-21 | Stop reason: HOSPADM

## 2025-04-21 RX ORDER — HALOPERIDOL 5 MG/ML
1 INJECTION INTRAMUSCULAR
Status: DISCONTINUED | OUTPATIENT
Start: 2025-04-21 | End: 2025-04-21 | Stop reason: HOSPADM

## 2025-04-21 RX ORDER — ONDANSETRON 2 MG/ML
4 INJECTION INTRAMUSCULAR; INTRAVENOUS
Status: DISCONTINUED | OUTPATIENT
Start: 2025-04-21 | End: 2025-04-21 | Stop reason: HOSPADM

## 2025-04-21 RX ORDER — ONDANSETRON 2 MG/ML
INJECTION INTRAMUSCULAR; INTRAVENOUS PRN
Status: DISCONTINUED | OUTPATIENT
Start: 2025-04-21 | End: 2025-04-21 | Stop reason: SURG

## 2025-04-21 RX ORDER — PHENYLEPHRINE HCL IN 0.9% NACL 1 MG/10 ML
SYRINGE (ML) INTRAVENOUS PRN
Status: DISCONTINUED | OUTPATIENT
Start: 2025-04-21 | End: 2025-04-21 | Stop reason: SURG

## 2025-04-21 RX ORDER — METOCLOPRAMIDE HYDROCHLORIDE 5 MG/ML
INJECTION INTRAMUSCULAR; INTRAVENOUS PRN
Status: DISCONTINUED | OUTPATIENT
Start: 2025-04-21 | End: 2025-04-21 | Stop reason: SURG

## 2025-04-21 RX ORDER — HYDROCODONE BITARTRATE AND ACETAMINOPHEN 7.5; 325 MG/15ML; MG/15ML
15 SOLUTION ORAL
Status: DISCONTINUED | OUTPATIENT
Start: 2025-04-21 | End: 2025-04-21 | Stop reason: HOSPADM

## 2025-04-21 RX ADMIN — PROPOFOL 150 MG: 10 INJECTION, EMULSION INTRAVENOUS at 12:55

## 2025-04-21 RX ADMIN — SODIUM CHLORIDE, POTASSIUM CHLORIDE, SODIUM LACTATE AND CALCIUM CHLORIDE: 600; 310; 30; 20 INJECTION, SOLUTION INTRAVENOUS at 12:51

## 2025-04-21 RX ADMIN — METOCLOPRAMIDE HYDROCHLORIDE 5 MG: 5 INJECTION INTRAMUSCULAR; INTRAVENOUS at 13:04

## 2025-04-21 RX ADMIN — Medication 100 MCG: at 13:12

## 2025-04-21 RX ADMIN — Medication 100 MCG: at 13:10

## 2025-04-21 RX ADMIN — PROPOFOL 50 MG: 10 INJECTION, EMULSION INTRAVENOUS at 12:56

## 2025-04-21 RX ADMIN — Medication 200 MCG: at 13:16

## 2025-04-21 RX ADMIN — LIDOCAINE HYDROCHLORIDE 100 MG: 20 INJECTION, SOLUTION EPIDURAL; INFILTRATION; INTRACAUDAL; PERINEURAL at 12:55

## 2025-04-21 RX ADMIN — ONDANSETRON 4 MG: 2 INJECTION INTRAMUSCULAR; INTRAVENOUS at 13:02

## 2025-04-21 RX ADMIN — Medication 100 MCG: at 12:57

## 2025-04-21 RX ADMIN — PROPOFOL 150 MG: 10 INJECTION, EMULSION INTRAVENOUS at 13:00

## 2025-04-21 ASSESSMENT — PAIN DESCRIPTION - PAIN TYPE
TYPE: ACUTE PAIN
TYPE: SURGICAL PAIN

## 2025-04-21 ASSESSMENT — FIBROSIS 4 INDEX: FIB4 SCORE: 1.28

## 2025-04-21 NOTE — DISCHARGE INSTRUCTIONS
Bronchoscopy Discharge Instructions  Home Care Instructions    ACTIVITY: Rest and take it easy for the first 24 hours.  A responsible adult is recommended to remain with you during that time.  It is normal to feel sleepy.  We encourage you to not do anything that requires balance, judgment or coordination.    The medicine you had during the bronchoscopy will make you sleepy.    FOR 24 HOURS DO NOT:  Drive, operate machinery or run household appliances.  Drink beer or alcoholic beverages.  Make important decisions or sign legal documents.  Engage in activity that requires sharp judgment and reflexes for 24 hours    SPECIAL INSTRUCTIONS:     Bronchoscopy is a procedure to look inside your windpipe and bronchial tubes.  An anesthetic solution is sprayed in your throat to make it numb.    You may experience a mild sore throat, hoarseness, fever up to 101?F, and /or coughing up small amounts of blood immediately following your bronchoscopy, especially if a biopsy was performed.  The discomfort should subside in 24-48 hours.    Do not smoke for 6-8 hours after the procedure to decrease your risk of coughing and /or bleeding.    Do not drink fluids or eat until your gag reflex returns, for two hours after the bronchoscopy.  Otherwise you will not feel the food or fluid in your throat, and it may go down your windpipe and cause you to choke.    Take ice chips or slowly sip cool fluids to make sure your gag reflex has returned.  Avoid hot fluids from the microwave for several hours.    After 2 hours or when you get home you may take throat lozenges or gargle with salt water if your throat is sore.  Drink liquids to help dryness in your mouth and throat.    Resume your normal activities the following day.    MEDICATIONS: Resume taking daily medication as directed by your doctor.     A follow-up appointment should be arranged with your doctor in 1 week to get the results of the bronchoscopy and any tests done during  the procedure; call to [phone]      You should CALL YOUR PHYSICIAN if you develop:  Fever greater than 101?F  You cough up more than a teaspoon of blood other than blood-tinged mucus  You have increasing amounts of bleeding from coughing after the bronchoscopy  You are wheezing  You develop any unusual signs or symptoms or have any questions                You should call 911 if you develop problems with breathing or chest pain.    If you are unable to contact your doctor or surgical center, you should go to the nearest emergency room or urgent care center.  Physician's telephone #: [phone]    You may receive a survey in the mail within the next two weeks and we ask that you take a few moments to complete the survey and return it to us.  Our goal is to provide you with very good care and we value your comments.

## 2025-04-21 NOTE — ANESTHESIA POSTPROCEDURE EVALUATION
Patient: Clem Soto    Procedure Summary       Date: 04/21/25 Room / Location:  ENDOSCOPIC ULTRASOUND ROOM / SURGERY [facility]    Anesthesia Start: 1251 Anesthesia Stop: 1329    Procedure: FIBER OPTIC BRONCHOSCOPY WITH  WASH, BRUSH, BRONCHOALVEOLAR LAVAGE, BIOPSY AND FINE NEEDLE ASPIRATION (Chest) Diagnosis:       Abnormal finding on imaging      (Abnormal finding on imaging [[id number]])    Surgeons: Candice Alejandra D.O. Responsible Provider: Logan Coon M.D.    Anesthesia Type: general ASA Status: 2            Final Anesthesia Type: general  Last vitals  BP   Blood Pressure : 125/84    Temp        Pulse   89   Resp   12    SpO2   93 %      Anesthesia Post Evaluation    Patient location during evaluation: PACU  Patient participation: complete - patient participated  Level of consciousness: awake and alert    Airway patency: patent  Anesthetic complications: no  Cardiovascular status: hemodynamically stable  Respiratory status: acceptable  Hydration status: euvolemic    PONV: none          There were no known notable events for this encounter.     Nurse Pain Score: 8 (NPRS)

## 2025-04-21 NOTE — PROGRESS NOTES
1350: Report from Korin PERAZA. Pt awake, denies pain or nausea  1355: No change.  1410: No change. Meets criteria to transfer to stage 2.

## 2025-04-21 NOTE — OR NURSING
1200:  Pt allergies and NPO status verified. Home medications reconciled, preferred pharmacy verified. Belongings secured in chart. Pt verbalizes understanding of pain scale, expected course of stay, and plan of care. Surgical site verified. IV placed.

## 2025-04-21 NOTE — PROCEDURES
Bronchoscopy Procedure Note      Results/Findings: purulent secretions, R>L    Specimen: RML BAL     Location: [facility] Endoscopy Suite    Date of Operation: 4/21/2025     Attending Physician Performing Procedure: Candice Alejandra D.O.     Pre-op Diagnosis: tree in bud, concern for ESTER    Post-op Diagnosis: same    Anesthesia: General, administered by Dr Coon    Operation:   Diagnostic flexible fiberoptic bronchoscopy, with bronchoalveolar lavage    Estimated Blood Loss: <5cc    Complications: none    Indications and History:    The risks, benefits, complications, treatment options and expected outcomes were discussed with the patient. The possibilities of reaction to medication, pulmonary aspiration, perforation of a viscus, bleeding, failure to diagnose a condition and creating a complication requiring transfusion or operation were discussed with the patient who freely signed the consent.    Description of Procedure:    The patient was seen in the Pre-op area and interval H&P was verified. The patient was taken to the endoscopy suite, identified as Clem Soto and a Time Out was held and the above information confirmed. After the induction of general anesthesia, the bronchoscope was passed through the ETT. The scope was then passed into the trachea.  Careful inspection of the tracheal lumen was accomplished. The scope was sequentially passed into the left main and then left upper and lower bronchi and segmental bronchi. The scope was then withdrawn and advanced into the right main bronchus and then into the RUL, RML, and RLL bronchi and segmental bronchi.    Findings:    Trachea: normal bronchial mucosa  Tierney: normal bronchial mucosa  Right main bronchus: erythematous, errosive looking mucosa  Right upper lobe bronchus: erythematous, errosive looking mucosa  Right middle lobe bronchus: erythematous, errosive looking mucosa  Right lower lobe bronchus: erythematous, errosive looking  mucosa  Left main bronchus: erythematous, errosive looking mucosa  Left upper lobe bronchus: erythematous, errosive looking mucosa  Left lower lobe bronchus: erythematous, errosive looking mucosa    Bronchoalveolar lavage was subsequently performed in the RML. Lavage specimen was purulent in gross appearance.  __________  This note was generated using voice recognition software which has a chance of producing errors of grammar and content.  I have made every reasonable attempt to find and correct any errors, but it should be expected that some may not be found prior to finalization of this note.    Candice Alejandra, DO   Pulmonary and Critical Care

## 2025-04-21 NOTE — H&P
"Pulmonary History & Physical Note    Date of Service  4/21/2025    Primary Care Physician  Keshawn Rico M.D.    History of Presenting Illness  \"Pérez Soto is a very pleasant 79 y.o. male never smoker referred to the pulmonary clinic for abnormal CT chest 7/2024     Pérez recently moved from [facility] to [facility] in 5/2024.  1 year prior to his relocation he contracted COVID and reports that he has had \"long COVID\" symptoms ever since.  This includes a loss of weight, poor appetite, and chronic joint pain which is worsened.  He has chronic neuraxial joint pain as well as synovitis/pain in his DIP and MCP joints.  He reports that his prior workup for autoimmune conditions including rheumatoid arthritis has been negative.       From a pulmonary perspective he reports no respiratory symptoms whatsoever.  He is limited primarily by arthritis in his back more so than any shortness of breath.  He reports no aspiration or reflux symptoms.  No sputum production whatsoever.  Mild nasal congestion in the mornings which resolves in about half an hour on his own.     CT chest was performed in 7/2024, personally reviewed demonstrating apical scarring suggestive of old granulomatous disease with associated pleural calcifications and tree-in-bud opacities with bronchial wall thickening and mild bronchiectasis in the right upper lobe, as well as a 5 mm nodule in the lingula     He is a retired psychologist.  He has lived throughout the [place].  No TB exposures.  No  history.     PMH: Mild pectus excavatum, mitral valve prolapse with moderate mitral regurgitation, coronary artery calcifications, diverticulosis     MMRC Grade: n/a, primarily limited by back pain see above\" Dr Lu         Due to concern for ESTER by primary care to explain weight loss, they have requested a bronchoscopy be performed.  Planning for that today.    Review of Systems   All other systems reviewed and are negative.      Past " Medical History   has a past medical history of Arthritis, Breath shortness, Cough, COVID, Enlarged prostate, High cholesterol, Stomach ache, and Wheezing.    Surgical History   has a past surgical history that includes lumbar laminectomy diskectomy.     Family History  family history is not on file.       Social History   reports that he has never smoked. He has never used smokeless tobacco. He reports current alcohol use. He reports that he does not currently use drugs.    Allergies  No Known Allergies    Medications  Prior to Admission Medications   Prescriptions Last Dose Informant Patient Reported? Taking?   ASPIRIN 81 PO 4/17/2025  Yes No   Sig: Take  by mouth every day.   Cholecalciferol (D3) 1000 UNIT Cap 4/18/2025  Yes No   Sig: Take  by mouth every day.   Coenzyme Q10 (Q-10 CO-ENZYME PO) 4/17/2025  Yes No   Sig: Take  by mouth every day.   Omega-3 Fatty Acids (OMEGA 3 PO) 4/17/2025  Yes No   Sig: Take 1,280 mg by mouth in the morning, at noon, and at bedtime.   albuterol 108 (90 Base) MCG/ACT Aero Soln inhalation aerosol 4/20/2025 at  9:00 PM  Yes Yes   Sig: INHALE 2 PUFFS BY INHALATION EVERY 6 HOURS AS NEEDED FOR SHORTNESS OF BREATH/WHEEZING   atorvastatin (LIPITOR) 40 MG Tab 4/20/2025 at  9:00 PM  No Yes   Sig: Take 1 Tablet by mouth every evening.   cholestyramine (QUESTRAN) 4 g packet 4/18/2025  Yes No   Sig: Take 1 Packet by mouth every day.   cycloSPORINE (RESTASIS) 0.05 % ophthalmic emulsion   Yes No   Sig: Administer 1 Drop into both eyes 2 times a day.   famotidine (PEPCID) 40 MG Tab 4/20/2025 at  9:00 PM  Yes Yes   Sig: Take 40 mg by mouth every day.   finasteride (PROSCAR) 5 MG Tab 4/20/2025 Evening  No Yes   Sig: Take 1 Tablet by mouth every day.   gabapentin (NEURONTIN) 100 MG Cap   No No   Sig: Take 1 Capsule by mouth 3 times a day.   Patient taking differently: Take 100 mg by mouth 3 times a day as needed.   megestrol (MEGACE) 40 MG/ML Suspension 4/16/2025  Yes No   Sig: Take 200 mg by mouth  "every day.   ondansetron (ZOFRAN ODT) 8 MG TABLET DISPERSIBLE 4/16/2025  Yes No   Sig: PLACE 1 TABLET TABLET UNDER THE TONGUE TWICE DAILY AS NEEDED FOR NAUSEA/VOMITING   tadalafil (CIALIS) 5 MG tablet 4/18/2025  No No   Sig: Take 1 Tablet by mouth every day for 360 days.   traMADol (ULTRAM) 50 MG Tab 4/20/2025 Noon  Yes Yes   Sig: TAKE 1 TABLET BY MOUTH THREE TIMES A DAY AS NEEDED FOR 30 DAYS      Facility-Administered Medications: None       Physical Exam  Pulse:  [89] 89  Resp:  [12] 12  BP: (125)/(84) 125/84  SpO2:  [93 %] 93 %  Blood Pressure : 125/84       Pulse: 89   Respiration: 12   Pulse Oximetry: 93 %       Physical Exam  Vitals and nursing note reviewed.   Constitutional:       Appearance: Normal appearance.   HENT:      Head: Normocephalic.   Eyes:      Conjunctiva/sclera: Conjunctivae normal.   Cardiovascular:      Rate and Rhythm: Normal rate and regular rhythm.   Pulmonary:      Effort: Pulmonary effort is normal.      Breath sounds: Normal breath sounds.   Skin:     General: Skin is warm and dry.   Neurological:      General: No focal deficit present.      Mental Status: He is alert and oriented to person, place, and time. Mental status is at baseline.   Psychiatric:         Mood and Affect: Mood normal.         Behavior: Behavior normal.         Laboratory:          No results for input(s): \"ALTSGPT\", \"ASTSGOT\", \"ALKPHOSPHAT\", \"TBILIRUBIN\", \"DBILIRUBIN\", \"GAMMAGT\", \"AMYLASE\", \"LIPASE\", \"ALB\", \"PREALBUMIN\", \"GLUCOSE\" in the last 72 hours.      No results for input(s): \"NTPROBNP\" in the last 72 hours.      No results for input(s): \"TROPONINT\" in the last 72 hours.    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  I have made every reasonable attempt to find and correct any errors, but it should be expected that some may not be found prior to finalization of this note.    Candice Alejandra, DO   Pulmonary and Critical Care

## 2025-04-21 NOTE — OR NURSING
1325- To PACU from [facility] via gurney, sleeping, respirations spontaneous and non-labored via Mask.    1340-Pt awake. Pt denies pain and nausea. Respirations are equal and unlabored.     1350- Report to Saadia PERAZA.

## 2025-04-21 NOTE — ANESTHESIA TIME REPORT
Anesthesia Start and Stop Event Times       Date Time Event    4/21/2025 1221 Ready for Procedure     1251 Anesthesia Start     1329 Anesthesia Stop          Responsible Staff  04/21/25      Name Role Begin End    Logan Coon M.D. Anesth 1251 1329          Overtime Reason:  no overtime (within assigned shift)    Comments:

## 2025-04-21 NOTE — OR NURSING
1452 Pt arrived from PACU post FIBER OPTIC BRONCHOSCOPY WITH  WASH, BRUSH, BRONCHOALVEOLAR LAVAGE, BIOPSY AND FINE NEEDLE ASPIRATION  , report received. Pt states pain is tollerable and denies nausea at this time. Pt dressing @ BS with assistance.    1532 Discharge instructions and medications gone over with Pt and ride. All questions answered. Pt meets DC criteria. PIV DC'd. Pt transported to POV via WC in stable condition.

## 2025-04-21 NOTE — ANESTHESIA PROCEDURE NOTES
Airway    Date/Time: 4/21/2025 12:55 PM    Performed by: Logan Coon M.D.  Authorized by: Logan Coon M.D.    Location:  OR  Urgency:  Elective  Indications for Airway Management:  Anesthesia      Spontaneous Ventilation: absent    Sedation Level:  Deep  Preoxygenated: Yes    Final Airway Type:  Supraglottic airway  Final Supraglottic Airway:  Standard LMA    SGA Size:  4  Number of Attempts at Approach:  1

## 2025-04-22 LAB
FUNGUS SPEC FUNGUS STN: NORMAL
SIGNIFICANT IND 70042: NORMAL
SITE SITE: NORMAL
SOURCE SOURCE: NORMAL

## 2025-04-23 ENCOUNTER — HOSPITAL ENCOUNTER (OUTPATIENT)
Dept: CARDIOLOGY | Facility: MEDICAL CENTER | Age: 80
End: 2025-04-23
Attending: INTERNAL MEDICINE
Payer: MEDICARE

## 2025-04-23 DIAGNOSIS — I34.0 MODERATE MITRAL REGURGITATION: ICD-10-CM

## 2025-04-23 LAB
GRAM STN SPEC: NORMAL
PRELIMINARY RPT Q0601: NORMAL
RHODAMINE-AURAMINE STN SPEC: NORMAL
SIGNIFICANT IND 70042: NORMAL
SITE SITE: NORMAL
SOURCE SOURCE: NORMAL

## 2025-04-23 PROCEDURE — 93306 TTE W/DOPPLER COMPLETE: CPT

## 2025-04-23 PROCEDURE — 700117 HCHG RX CONTRAST REV CODE 255: Performed by: INTERNAL MEDICINE

## 2025-04-23 RX ADMIN — HUMAN ALBUMIN MICROSPHERES AND PERFLUTREN 3 ML: 10; .22 INJECTION, SOLUTION INTRAVENOUS at 11:46

## 2025-04-24 LAB
BACTERIA SPEC RESP CULT: ABNORMAL
BACTERIA SPEC RESP CULT: ABNORMAL
FUNGUS SPEC CULT: NORMAL
FUNGUS SPEC FUNGUS STN: NORMAL
GRAM STN SPEC: ABNORMAL
SIGNIFICANT IND 70042: ABNORMAL
SIGNIFICANT IND 70042: NORMAL
SITE SITE: ABNORMAL
SITE SITE: NORMAL
SOURCE SOURCE: ABNORMAL
SOURCE SOURCE: NORMAL

## 2025-04-25 ENCOUNTER — HOSPITAL ENCOUNTER (OUTPATIENT)
Facility: MEDICAL CENTER | Age: 80
End: 2025-04-25
Attending: INTERNAL MEDICINE
Payer: OTHER MISCELLANEOUS

## 2025-04-25 LAB
ALBUMIN SERPL BCP-MCNC: 3.5 G/DL (ref 3.2–4.9)
ALBUMIN/GLOB SERPL: 1.1 G/DL
ALP SERPL-CCNC: 88 U/L (ref 30–99)
ALT SERPL-CCNC: 46 U/L (ref 2–50)
ANION GAP SERPL CALC-SCNC: 8 MMOL/L (ref 7–16)
AST SERPL-CCNC: 25 U/L (ref 12–45)
BASOPHILS # BLD AUTO: 0.6 % (ref 0–1.8)
BASOPHILS # BLD: 0.06 K/UL (ref 0–0.12)
BILIRUB SERPL-MCNC: 0.6 MG/DL (ref 0.1–1.5)
BUN SERPL-MCNC: 36 MG/DL (ref 8–22)
CALCIUM ALBUM COR SERPL-MCNC: 9.4 MG/DL (ref 8.5–10.5)
CALCIUM SERPL-MCNC: 9 MG/DL (ref 8.5–10.5)
CHLORIDE SERPL-SCNC: 103 MMOL/L (ref 96–112)
CO2 SERPL-SCNC: 23 MMOL/L (ref 20–33)
CREAT SERPL-MCNC: 0.67 MG/DL (ref 0.5–1.4)
CRP SERPL HS-MCNC: 0.64 MG/DL (ref 0–0.75)
EOSINOPHIL # BLD AUTO: 0.25 K/UL (ref 0–0.51)
EOSINOPHIL NFR BLD: 2.5 % (ref 0–6.9)
ERYTHROCYTE [DISTWIDTH] IN BLOOD BY AUTOMATED COUNT: 50.6 FL (ref 35.9–50)
ERYTHROCYTE [SEDIMENTATION RATE] IN BLOOD BY WESTERGREN METHOD: 14 MM/HOUR (ref 0–20)
EST. AVERAGE GLUCOSE BLD GHB EST-MCNC: 131 MG/DL
GFR SERPLBLD CREATININE-BSD FMLA CKD-EPI: 94 ML/MIN/1.73 M 2
GLOBULIN SER CALC-MCNC: 3.2 G/DL (ref 1.9–3.5)
GLUCOSE SERPL-MCNC: 92 MG/DL (ref 65–99)
HBA1C MFR BLD: 6.2 % (ref 4–5.6)
HCT VFR BLD AUTO: 45.2 % (ref 42–52)
HGB BLD-MCNC: 14.7 G/DL (ref 14–18)
IMM GRANULOCYTES # BLD AUTO: 0.05 K/UL (ref 0–0.11)
IMM GRANULOCYTES NFR BLD AUTO: 0.5 % (ref 0–0.9)
LYMPHOCYTES # BLD AUTO: 1.07 K/UL (ref 1–4.8)
LYMPHOCYTES NFR BLD: 10.9 % (ref 22–41)
MCH RBC QN AUTO: 30.8 PG (ref 27–33)
MCHC RBC AUTO-ENTMCNC: 32.5 G/DL (ref 32.3–36.5)
MCV RBC AUTO: 94.6 FL (ref 81.4–97.8)
MONOCYTES # BLD AUTO: 1.06 K/UL (ref 0–0.85)
MONOCYTES NFR BLD AUTO: 10.8 % (ref 0–13.4)
NEUTROPHILS # BLD AUTO: 7.34 K/UL (ref 1.82–7.42)
NEUTROPHILS NFR BLD: 74.7 % (ref 44–72)
NRBC # BLD AUTO: 0 K/UL
NRBC BLD-RTO: 0 /100 WBC (ref 0–0.2)
PLATELET # BLD AUTO: 291 K/UL (ref 164–446)
PMV BLD AUTO: 9.5 FL (ref 9–12.9)
POTASSIUM SERPL-SCNC: 4 MMOL/L (ref 3.6–5.5)
PROT SERPL-MCNC: 6.7 G/DL (ref 6–8.2)
RBC # BLD AUTO: 4.78 M/UL (ref 4.7–6.1)
SODIUM SERPL-SCNC: 134 MMOL/L (ref 135–145)
WBC # BLD AUTO: 9.8 K/UL (ref 4.8–10.8)

## 2025-04-25 PROCEDURE — 83036 HEMOGLOBIN GLYCOSYLATED A1C: CPT | Mod: GA

## 2025-04-25 PROCEDURE — 86038 ANTINUCLEAR ANTIBODIES: CPT

## 2025-04-25 PROCEDURE — 82103 ALPHA-1-ANTITRYPSIN TOTAL: CPT

## 2025-04-25 PROCEDURE — 86036 ANCA SCREEN EACH ANTIBODY: CPT

## 2025-04-25 PROCEDURE — 86140 C-REACTIVE PROTEIN: CPT

## 2025-04-25 PROCEDURE — 82164 ANGIOTENSIN I ENZYME TEST: CPT

## 2025-04-25 PROCEDURE — 86738 MYCOPLASMA ANTIBODY: CPT | Mod: 91

## 2025-04-25 PROCEDURE — 83516 IMMUNOASSAY NONANTIBODY: CPT

## 2025-04-25 PROCEDURE — 85025 COMPLETE CBC W/AUTO DIFF WBC: CPT

## 2025-04-25 PROCEDURE — 85652 RBC SED RATE AUTOMATED: CPT

## 2025-04-25 PROCEDURE — 36415 COLL VENOUS BLD VENIPUNCTURE: CPT

## 2025-04-25 PROCEDURE — 80053 COMPREHEN METABOLIC PANEL: CPT

## 2025-04-27 LAB
M PNEUMO IGG SER IA-ACNC: 0.44 U/L
M PNEUMO IGM SER IA-ACNC: 0.05 U/L

## 2025-04-28 ENCOUNTER — RESULTS FOLLOW-UP (OUTPATIENT)
Dept: CARDIOLOGY | Facility: MEDICAL CENTER | Age: 80
End: 2025-04-28

## 2025-04-28 LAB
A1AT SERPL-MCNC: 188 MG/DL (ref 90–200)
ACE SERPL-CCNC: 44 U/L (ref 16–85)
ANCA IFA PATTERN Q6048: NORMAL
ANCA IFA TITER Q6047: NORMAL
LV EJECT FRACT  99904: 65
MYELOPEROXIDASE AB SER-ACNC: 0 AU/ML (ref 0–19)
NUCLEAR IGG SER QL IA: NORMAL
PROTEINASE3 AB SER-ACNC: 1 AU/ML (ref 0–19)

## 2025-04-28 PROCEDURE — 93306 TTE W/DOPPLER COMPLETE: CPT | Mod: 26 | Performed by: INTERNAL MEDICINE

## 2025-05-09 ENCOUNTER — TELEPHONE (OUTPATIENT)
Dept: SLEEP MEDICINE | Facility: MEDICAL CENTER | Age: 80
End: 2025-05-09
Payer: MEDICARE

## 2025-05-09 ENCOUNTER — OFFICE VISIT (OUTPATIENT)
Dept: SLEEP MEDICINE | Facility: MEDICAL CENTER | Age: 80
End: 2025-05-09
Payer: MEDICARE

## 2025-05-09 VITALS
DIASTOLIC BLOOD PRESSURE: 56 MMHG | WEIGHT: 118 LBS | HEART RATE: 100 BPM | OXYGEN SATURATION: 94 % | SYSTOLIC BLOOD PRESSURE: 114 MMHG | HEIGHT: 71 IN | BODY MASS INDEX: 16.52 KG/M2

## 2025-05-09 DIAGNOSIS — J47.9 BRONCHIECTASIS WITHOUT COMPLICATION (HCC): ICD-10-CM

## 2025-05-09 DIAGNOSIS — A49.8 PSEUDOMONAS AERUGINOSA INFECTION: ICD-10-CM

## 2025-05-09 DIAGNOSIS — J43.2 CENTRILOBULAR EMPHYSEMA (HCC): ICD-10-CM

## 2025-05-09 PROCEDURE — 99214 OFFICE O/P EST MOD 30 MIN: CPT

## 2025-05-09 PROCEDURE — 3078F DIAST BP <80 MM HG: CPT

## 2025-05-09 PROCEDURE — 3074F SYST BP LT 130 MM HG: CPT

## 2025-05-09 PROCEDURE — 99213 OFFICE O/P EST LOW 20 MIN: CPT

## 2025-05-09 RX ORDER — TESTOSTERONE CYPIONATE 200 MG/ML
INJECTION, SOLUTION INTRAMUSCULAR
COMMUNITY
Start: 2025-05-02

## 2025-05-09 RX ORDER — CIPROFLOXACIN 750 MG/1
750 TABLET, FILM COATED ORAL 2 TIMES DAILY
Qty: 28 TABLET | Refills: 0 | Status: SHIPPED | OUTPATIENT
Start: 2025-05-09 | End: 2025-05-20

## 2025-05-09 ASSESSMENT — ENCOUNTER SYMPTOMS
DIARRHEA: 0
SPUTUM PRODUCTION: 0
HEMOPTYSIS: 0
HEARTBURN: 0
FEVER: 0
SHORTNESS OF BREATH: 1
WEAKNESS: 0
PALPITATIONS: 0
VOMITING: 0
DIAPHORESIS: 0
COUGH: 1
HEADACHES: 0
CHILLS: 0
DIZZINESS: 0
SINUS PAIN: 0
WEIGHT LOSS: 1
MYALGIAS: 0
FALLS: 0
WHEEZING: 1
NAUSEA: 0

## 2025-05-09 ASSESSMENT — FIBROSIS 4 INDEX: FIB4 SCORE: 1.03

## 2025-05-09 NOTE — PATIENT INSTRUCTIONS
Purchase Acapella flutter valve off of [place]    Airway clearance regimen for bronchiectasis:  Nebulizer followed by flutter valve twice a day.  The hypertonic saline can be irritating to your airway.  If you start to cough up blood or blood-tinged sputum, please stop and let me know.    Infection:  Pseudomonas aeruginosa, which we will treat with ciprofloxacin 750mg twice a day for 14 days.  Please stop the medication if you experience tendon pain.

## 2025-05-09 NOTE — ASSESSMENT & PLAN NOTE
CT chest in 2024 shows bronchiectasis, 5 mm pulmonary nodule in lingula, and mild emphysema.  Patient does not have any risk factors for emphysema.  PFTs do not show any restriction or obstruction, but do show air trapping.  --Airway clearance with hypertonic saline followed by flutter valve  --Instructed patient on proper use  --Alpha-1 antitrypsin phenotype  -- We will repeat PFTs in 3 months.

## 2025-05-09 NOTE — ASSESSMENT & PLAN NOTE
Patient was bronched in April 2025 for concern of NTM.  Bronch culture came back positive for Pseudomonas which was pansensitive.  Fungal smears are negative.  AFB  cultures were negative.  -- Ciprofloxacin 750 mg x 14 days  --QTc normal  -- Advised patient on side effects such as tendon rupture

## 2025-05-09 NOTE — PROGRESS NOTES
Pulmonary Clinic Note    Date of Visit: 5/9/2025     Chief Complaint:  Chief Complaint   Patient presents with    Follow-Up     Last seen 01/28/25 w/ Dr. ALEXANDR Lu  Dx: Bronchiolitis, Granulomatous lung disease,  Pleural calcification     Results     Labs 04/21/25, CT CHEST 04/14/25, PFT 02/04/25     HPI:   Clem Soto is a very pleasant 80 y.o. year old male never smoker, with a PMHx of bronchiectasis, granulomatous lung disease, pleural calcifications, pulmonary nodules, Mild pectus excavatum, mitral valve prolapse with moderate mitral regurgitation, coronary artery calcifications, diverticulosis who presented to the Pulmonary Clinic for a regular follow up. Last seen in the office on 1/28/2025 with Dr. Lu.     Patient is followed by the pulmonary office for abnormal CT.  CT chest was performed in 7/2024, personally reviewed demonstrating apical scarring suggestive of old granulomatous disease with associated pleural calcifications and tree-in-bud opacities with bronchial wall thickening and mild bronchiectasis in the right upper lobe, as well as a 5 mm nodule in the lingula.   PFTs in 2025 show normal spirometry with air trapping with a FEV1 2.  By 3 L or 89%, FEV1/FVC 81%, %, DLCO 105% predicted, without positive bronchodilator response.  Immunoglobulins, cocci panel, histoplasmosis, RA, QuantiFERON, HLA-B27 are unremarkable.  Patient was bronched in April 2025 for concern of NTM.  Bronch culture came back positive for Pseudomonas which was pansensitive.  Fungal smears are negative.  AFB  cultures were negative.  Alpha 1 antitrypsin level 188 but no phenotype was done.     Interval events:  5/9/2025-  Patient states that he has shortness of breath with mMRC of 4 and has a cough and occasional wheezing.  He does state that he is fatigued and has had a decrease in his exercise stamina.  He denies any significant sputum production, fevers, or chills.      Oxygen use:  None     MMRC Grade:   4      Past Medical History:   Diagnosis Date    Arthritis     osteoarthritis neck and back    Breath shortness     Cough     COVID     Pt believes he has had long covid since May 2023    Enlarged prostate     High cholesterol     Stomach ache     at night, takes pepcid    Wheezing     very wild, with sleeping     Past Surgical History:   Procedure Laterality Date    AR BRONCHOSCOPY,DIAGNOSTIC N/A 4/21/2025    Procedure: FIBER OPTIC BRONCHOSCOPY WITH  WASH, BRUSH, BRONCHOALVEOLAR LAVAGE, BIOPSY AND FINE NEEDLE ASPIRATION;  Surgeon: Candice Alejandra D.O.;  Location: SURGERY [facility];  Service: Pulmonary    LUMBAR LAMINECTOMY DISKECTOMY       Social History     Socioeconomic History    Marital status:      Spouse name: Not on file    Number of children: Not on file    Years of education: Not on file    Highest education level: Not on file   Occupational History    Not on file   Tobacco Use    Smoking status: Never    Smokeless tobacco: Never   Vaping Use    Vaping status: Never Used   Substance and Sexual Activity    Alcohol use: Yes     Alcohol/week: 0.6 oz     Types: 1 Cans of beer per week     Comment: 1 beer every 3 weeks    Drug use: Not Currently    Sexual activity: Not on file     Comment: , 1 daughter, no grands   Other Topics Concern    Not on file   Social History Narrative    Not on file     Social Drivers of Health     Financial Resource Strain: Low Risk  (11/12/2023)    Received from [facility]    Overall Financial Resource Strain (CARDIA)     Difficulty of Paying Living Expenses: Not hard at all   Food Insecurity: No Food Insecurity (11/12/2023)    Received from [facility]    Hunger Vital Sign     Worried About Running Out of Food in the Last Year: Never true     Ran Out of Food in the Last Year: Never true   Transportation Needs: No Transportation Needs (11/12/2023)    Received from [facility]    PRAPARE - Transportation     Lack of  Transportation (Medical): No     Lack of Transportation (Non-Medical): No   Physical Activity: Insufficiently Active (11/12/2023)    Received from [facility]    Exercise Vital Sign     Days of Exercise per Week: 3 days     Minutes of Exercise per Session: 30 min   Stress: No Stress Concern Present (11/12/2023)    Received from [facility]    [place] [employer] - Occupational Stress Questionnaire     Feeling of Stress : Not at all   Social Connections: Moderately Integrated (11/12/2023)    Received from [facility]    Social Connection and Isolation Panel [NHANES]     Frequency of Communication with Friends and Family: Twice a week     Frequency of Social Gatherings with Friends and Family: Once a week     Attends Jain Services: Never     Active Member of Clubs or Organizations: No     Attends Club or Organization Meetings: 1 to 4 times per year     Marital Status:    Intimate Partner Violence: Not At Risk (11/12/2023)    Received from [facility]    Humiliation, Afraid, Rape, and Kick questionnaire     Fear of Current or Ex-Partner: No     Emotionally Abused: No     Physically Abused: No     Sexually Abused: No   Housing Stability: Low Risk  (11/12/2023)    Received from [facility]    Housing Stability Vital Sign     Unable to Pay for Housing in the Last Year: No     Number of Places Lived in the Last Year: 1     Unstable Housing in the Last Year: No        No family history on file.  Current Outpatient Medications on File Prior to Visit   Medication Sig Dispense Refill    testosterone cypionate (DEPO-TESTOSTERONE) 200 MG/ML injection ADMINISTER 1 ML INTRAMUSCULARLY EVERY 2 WEEKS      albuterol 108 (90 Base) MCG/ACT Aero Soln inhalation aerosol INHALE 2 PUFFS BY INHALATION EVERY 6 HOURS AS NEEDED FOR SHORTNESS OF BREATH/WHEEZING      ondansetron (ZOFRAN ODT) 8 MG TABLET DISPERSIBLE PLACE 1 TABLET TABLET UNDER THE  TONGUE TWICE DAILY AS NEEDED FOR NAUSEA/VOMITING      megestrol (MEGACE) 40 MG/ML Suspension Take 200 mg by mouth every day.      gabapentin (NEURONTIN) 100 MG Cap Take 1 Capsule by mouth 3 times a day. (Patient taking differently: Take 100 mg by mouth 3 times a day as needed.) 90 Capsule 11    traMADol (ULTRAM) 50 MG Tab TAKE 1 TABLET BY MOUTH THREE TIMES A DAY AS NEEDED FOR 30 DAYS      ASPIRIN 81 PO Take  by mouth every day.      Coenzyme Q10 (Q-10 CO-ENZYME PO) Take  by mouth every day.      Cholecalciferol (D3) 1000 UNIT Cap Take  by mouth every day.      Omega-3 Fatty Acids (OMEGA 3 PO) Take 1,280 mg by mouth in the morning, at noon, and at bedtime.      atorvastatin (LIPITOR) 40 MG Tab Take 1 Tablet by mouth every evening. 100 Tablet 4    tadalafil (CIALIS) 5 MG tablet Take 1 Tablet by mouth every day for 360 days. 90 Tablet 3    finasteride (PROSCAR) 5 MG Tab Take 1 Tablet by mouth every day. 90 Tablet 3    famotidine (PEPCID) 40 MG Tab Take 40 mg by mouth every day.      cholestyramine (QUESTRAN) 4 g packet Take 1 Packet by mouth every day.      cycloSPORINE (RESTASIS) 0.05 % ophthalmic emulsion Administer 1 Drop into both eyes 2 times a day.       No current facility-administered medications on file prior to visit.     Allergies: Patient has no known allergies.    ROS:   Review of Systems   Constitutional:  Positive for malaise/fatigue and weight loss. Negative for chills, diaphoresis and fever.   HENT:  Negative for congestion and sinus pain.    Respiratory:  Positive for cough, shortness of breath and wheezing. Negative for hemoptysis and sputum production.    Cardiovascular:  Negative for chest pain, palpitations and leg swelling.   Gastrointestinal:  Negative for diarrhea, heartburn, nausea and vomiting.   Musculoskeletal:  Negative for falls and myalgias.   Neurological:  Negative for dizziness, weakness and headaches.       Vitals:  /56 (BP Location: Right arm, Patient Position: Sitting, BP  "Cuff Size: Small adult)   Pulse 100   Ht 1.803 m (5' 11\")   Wt 53.5 kg (118 lb)   SpO2 94%     Physical Exam:  Physical Exam  Constitutional:       General: He is not in acute distress.     Appearance: Normal appearance. He is not ill-appearing, toxic-appearing or diaphoretic.   Cardiovascular:      Rate and Rhythm: Normal rate and regular rhythm.      Pulses: Normal pulses.      Heart sounds: Normal heart sounds. No murmur heard.     No friction rub. No gallop.   Pulmonary:      Effort: Pulmonary effort is normal. No respiratory distress.      Breath sounds: No stridor. Rhonchi present. No wheezing or rales.   Musculoskeletal:         General: No swelling.      Right lower leg: No edema.      Left lower leg: No edema.   Skin:     General: Skin is warm.   Neurological:      General: No focal deficit present.      Mental Status: He is alert and oriented to person, place, and time.   Psychiatric:         Mood and Affect: Mood normal.         Behavior: Behavior normal.         Thought Content: Thought content normal.         Judgment: Judgment normal.         Laboratory Data:  PFTs: (Date: 2/4/2025)-      Impression:  Normal spirometry with significant air trapping. This is consistent with the reported history of bronchiolitis. Correlate with imaging and exam.     CT Chest: (Date: 4/18/2025)-  Impression:  Lungs: Moderate emphysematous changes.  Bronchiectasis in both upper lobes with minimal bronchial secretions in the anterior medial RIGHT upper lobe.  Ill-defined opacity in the periphery of the RIGHT upper lobe posterolaterally, consistent with scar.    Bronchiectasis and bronchial secretions in the lingula and RIGHT middle lobe.  Groundglass opacities in the lower lobes, more apparent on the RIGHT.     Mediastinum/Michelle: No significant adenopathy.     Pleura: Bilateral apical pleural scarring with calcified plaque, unchanged.     Cardiac: Heart normal in size without pericardial effusion. There is coronary " artery calcification.     Vascular: Unremarkable.     Soft tissues: Unremarkable.     Bones: Degenerative change of thoracic spine with mild kyphosis.      ECHO: (Date: 4/28/2025)-  Impression:  Compared to the prior study on 10/30/2024, no changes.   Normal left ventricular systolic function.   No evidence of valvular abnormality based on Doppler evaluation.      Assessment and Plan:    Problem List Items Addressed This Visit          Pulmonary/Sleep Medicine Problems    Centrilobular emphysema (HCC)    As above.         Relevant Medications    Sodium Chloride 3.5 % Nebu Soln    Other Relevant Orders    ALPHA-1 ANTITRYPSIN PHENOTYPE       Other    Pseudomonas aeruginosa infection    Patient was bronched in April 2025 for concern of NTM.  Bronch culture came back positive for Pseudomonas which was pansensitive.  Fungal smears are negative.  AFB  cultures were negative.  -- Ciprofloxacin 750 mg x 14 days  --QTc normal  -- Advised patient on side effects such as tendon rupture         Relevant Medications    ciprofloxacin (CIPRO) 750 MG Tab    Bronchiectasis without complication (HCC)    CT chest in 2024 shows bronchiectasis, 5 mm pulmonary nodule in lingula, and mild emphysema.  Patient does not have any risk factors for emphysema.  PFTs do not show any restriction or obstruction, but do show air trapping.  --Airway clearance with hypertonic saline followed by flutter valve  --Instructed patient on proper use  --Alpha-1 antitrypsin phenotype  -- We will repeat PFTs in 3 months.         Relevant Medications    Sodium Chloride 3.5 % Nebu Soln    Other Relevant Orders    DME Nebulizer    Adult BMI <19 kg/sq m     Diagnostic studies have been reviewed with the patient.    Return in about 4 weeks (around 6/6/2025), or if symptoms worsen or fail to improve, for bronchiectasis, with Freda.     This note was generated using voice recognition software which has a chance of producing errors of grammar and possibly content.  I  have made every reasonable attempt to find and correct any obvious errors, but it should be expected that some may not be found prior to finalization of this note.    Time spent in record review prior to patient arrival, reviewing results, and in face-to-face encounter totaled 32 min.  __________  SHIRA Alonso  Pulmonary Medicine  [facility]

## 2025-05-14 LAB
FUNGUS SPEC CULT: NORMAL
FUNGUS SPEC FUNGUS STN: NORMAL
SIGNIFICANT IND 70042: NORMAL
SITE SITE: NORMAL
SOURCE SOURCE: NORMAL

## 2025-05-16 ENCOUNTER — PATIENT MESSAGE (OUTPATIENT)
Dept: SLEEP MEDICINE | Facility: MEDICAL CENTER | Age: 80
End: 2025-05-16
Payer: MEDICARE

## 2025-05-19 ENCOUNTER — HOSPITAL ENCOUNTER (OUTPATIENT)
Facility: MEDICAL CENTER | Age: 80
End: 2025-05-19
Payer: MEDICARE

## 2025-05-19 DIAGNOSIS — J43.2 CENTRILOBULAR EMPHYSEMA (HCC): ICD-10-CM

## 2025-05-19 PROCEDURE — 82104 ALPHA-1-ANTITRYPSIN PHENO: CPT

## 2025-05-19 PROCEDURE — 36415 COLL VENOUS BLD VENIPUNCTURE: CPT

## 2025-05-19 PROCEDURE — 82103 ALPHA-1-ANTITRYPSIN TOTAL: CPT

## 2025-05-20 ENCOUNTER — OFFICE VISIT (OUTPATIENT)
Dept: CARDIOLOGY | Facility: MEDICAL CENTER | Age: 80
End: 2025-05-20
Attending: INTERNAL MEDICINE
Payer: MEDICARE

## 2025-05-20 VITALS
HEART RATE: 97 BPM | OXYGEN SATURATION: 96 % | RESPIRATION RATE: 16 BRPM | BODY MASS INDEX: 19.04 KG/M2 | WEIGHT: 136 LBS | DIASTOLIC BLOOD PRESSURE: 58 MMHG | SYSTOLIC BLOOD PRESSURE: 104 MMHG | HEIGHT: 71 IN

## 2025-05-20 DIAGNOSIS — R93.1 AGATSTON CORONARY ARTERY CALCIUM SCORE GREATER THAN 400: ICD-10-CM

## 2025-05-20 DIAGNOSIS — I25.10 CORONARY ARTERY DISEASE INVOLVING NATIVE CORONARY ARTERY OF NATIVE HEART WITHOUT ANGINA PECTORIS: ICD-10-CM

## 2025-05-20 DIAGNOSIS — E78.00 PURE HYPERCHOLESTEROLEMIA: Primary | ICD-10-CM

## 2025-05-20 DIAGNOSIS — R93.1 ELEVATED CORONARY ARTERY CALCIUM SCORE: ICD-10-CM

## 2025-05-20 PROCEDURE — G2211 COMPLEX E/M VISIT ADD ON: HCPCS | Performed by: INTERNAL MEDICINE

## 2025-05-20 PROCEDURE — 99213 OFFICE O/P EST LOW 20 MIN: CPT | Performed by: INTERNAL MEDICINE

## 2025-05-20 PROCEDURE — 3078F DIAST BP <80 MM HG: CPT | Performed by: INTERNAL MEDICINE

## 2025-05-20 PROCEDURE — 99214 OFFICE O/P EST MOD 30 MIN: CPT | Performed by: INTERNAL MEDICINE

## 2025-05-20 PROCEDURE — 3074F SYST BP LT 130 MM HG: CPT | Performed by: INTERNAL MEDICINE

## 2025-05-20 RX ORDER — ATORVASTATIN CALCIUM 40 MG/1
40 TABLET, FILM COATED ORAL NIGHTLY
Qty: 100 TABLET | Refills: 4 | Status: SHIPPED | OUTPATIENT
Start: 2025-05-20

## 2025-05-20 ASSESSMENT — ENCOUNTER SYMPTOMS
NAUSEA: 0
BLOOD IN STOOL: 0
ABDOMINAL PAIN: 0
BRUISES/BLEEDS EASILY: 0
EYE PAIN: 0
LOSS OF CONSCIOUSNESS: 0
FEVER: 0
PALPITATIONS: 0
SENSORY CHANGE: 0
PND: 0
VOMITING: 0
DIZZINESS: 0
HEADACHES: 0
FALLS: 0
MYALGIAS: 0
CHILLS: 0
BLURRED VISION: 0
SHORTNESS OF BREATH: 0
EYE DISCHARGE: 0
SPEECH CHANGE: 0
ORTHOPNEA: 0
CLAUDICATION: 0
DOUBLE VISION: 0
HALLUCINATIONS: 0
WEIGHT LOSS: 0
DEPRESSION: 0
COUGH: 0

## 2025-05-20 ASSESSMENT — FIBROSIS 4 INDEX: FIB4 SCORE: 1.03

## 2025-05-20 NOTE — PROGRESS NOTES
Chief Complaint   Patient presents with    Hyperlipidemia     F/V DX: Other hyperlipidemia [E78.49]    Other     F/V DX: Elevated coronary artery calcium score         Subjective     Pérez Soto is an 80 y.o. male who presents today due to elevated coronary Ca score (2607), HLP, moderate MR.    I have personally interpreted EKG today with patient, there is no evidence of acute coronary syndrome, no evidence of prior infarct, normal AR and QT interval, no significant conduction disease. Sinus rhythm.    Clem Soto does not have any history of heart attack arrhythmias in the past. he never had transthoracic echocardiogram, cardiac catheterization or ablations procedure in the past.     No family history of sudden cardiac death.    I have independently interpreted and reviewed blood tests results with patient in clinic which shows LDL level of 87, triglycerides level of 68, GFR of 96, K of 4.    10/2024 I have independently interpreted and reviewed echocardiogram's actual images with patient which showed normal left ventricular systolic function. No wall motion abnormality. No evidence of pulmonary hypertension. Moderate MR.    04/2025 I have independently interpreted and reviewed echocardiogram's actual images with patient which showed normal left ventricular systolic function. No wall motion abnormality. No evidence of pulmonary hypertension. No significant valvular disease.      In the interim, patient has been doing well without having any symptoms. Patient denies having chest pain, dyspnea, palpitation, presyncope, syncope episodes. Able to climb up at least 2 flights of stairs.    Past Medical History:   Diagnosis Date    Arthritis     osteoarthritis neck and back    Breath shortness     Cough     COVID     Pt believes he has had long covid since May 2023    Enlarged prostate     High cholesterol     Stomach ache     at night, takes pepcid    Wheezing     very wild, with sleeping     Past  Surgical History:   Procedure Laterality Date    OR BRONCHOSCOPY,DIAGNOSTIC N/A 4/21/2025    Procedure: FIBER OPTIC BRONCHOSCOPY WITH  WASH, BRUSH, BRONCHOALVEOLAR LAVAGE, BIOPSY AND FINE NEEDLE ASPIRATION;  Surgeon: Candice Alejandra D.O.;  Location: SURGERY [facility];  Service: Pulmonary    LUMBAR LAMINECTOMY DISKECTOMY       History reviewed. No pertinent family history.  Social History     Socioeconomic History    Marital status:      Spouse name: Not on file    Number of children: Not on file    Years of education: Not on file    Highest education level: Not on file   Occupational History    Not on file   Tobacco Use    Smoking status: Never    Smokeless tobacco: Never   Vaping Use    Vaping status: Never Used   Substance and Sexual Activity    Alcohol use: Yes     Alcohol/week: 0.6 oz     Types: 1 Cans of beer per week     Comment: 1 beer every 3 weeks    Drug use: Not Currently    Sexual activity: Not on file     Comment: , 1 daughter, no grands   Other Topics Concern    Not on file   Social History Narrative    Not on file     Social Drivers of Health     Financial Resource Strain: Low Risk  (11/12/2023)    Received from [facility]    Overall Financial Resource Strain (CARDIA)     Difficulty of Paying Living Expenses: Not hard at all   Food Insecurity: No Food Insecurity (11/12/2023)    Received from [facility]    Hunger Vital Sign     Worried About Running Out of Food in the Last Year: Never true     Ran Out of Food in the Last Year: Never true   Transportation Needs: No Transportation Needs (11/12/2023)    Received from [facility]    PRAPARE - Transportation     Lack of Transportation (Medical): No     Lack of Transportation (Non-Medical): No   Physical Activity: Insufficiently Active (11/12/2023)    Received from [facility]    Exercise Vital Sign     Days of Exercise per Week: 3 days     Minutes of Exercise per Session: 30 min   Stress: No Stress Concern Present (11/12/2023)     Received from [facility]"    [place] [employer]ational [employer] - Occupational Stress Questionnaire     Feeling of Stress : Not at all   Social Connections: Moderately Integrated (11/12/2023)    Received from [facility]"    Social Connection and Isolation Panel [NHANES]     Frequency of Communication with Friends and Family: Twice a week     Frequency of Social Gatherings with Friends and Family: Once a week     Attends Jainism Services: Never     Active Member of Clubs or Organizations: No     Attends Club or Organization Meetings: 1 to 4 times per year     Marital Status:    Intimate Partner Violence: Not At Risk (11/12/2023)    Received from "[facility]"    Humiliation, Afraid, Rape, and Kick questionnaire     Fear of Current or Ex-Partner: No     Emotionally Abused: No     Physically Abused: No     Sexually Abused: No   Housing Stability: Low Risk  (11/12/2023)    Received from [facility]"    Housing Stability Vital Sign     Unable to Pay for Housing in the Last Year: No     Number of Places Lived in the Last Year: 1     Unstable Housing in the Last Year: No     No Known Allergies  Outpatient Encounter Medications as of 5/20/2025   Medication Sig Dispense Refill    atorvastatin (LIPITOR) 40 MG Tab Take 1 Tablet by mouth every evening. 100 Tablet 4    testosterone cypionate (DEPO-TESTOSTERONE) 200 MG/ML injection ADMINISTER 1 ML INTRAMUSCULARLY EVERY 2 WEEKS      Sodium Chloride 3.5 % Nebu Soln Inhale 4 mL 2 times a day. 720 mL 11    albuterol 108 (90 Base) MCG/ACT Aero Soln inhalation aerosol INHALE 2 PUFFS BY INHALATION EVERY 6 HOURS AS NEEDED FOR SHORTNESS OF BREATH/WHEEZING      ondansetron (ZOFRAN ODT) 8 MG TABLET DISPERSIBLE PLACE 1 TABLET TABLET UNDER THE TONGUE TWICE DAILY AS NEEDED FOR NAUSEA/VOMITING      megestrol (MEGACE) 40 MG/ML Suspension Take 200 mg by mouth every day.      traMADol (ULTRAM) 50 MG Tab TAKE 1 TABLET BY MOUTH THREE TIMES A DAY AS NEEDED FOR 30 DAYS      ASPIRIN 81 PO  Take  by mouth every day.      Coenzyme Q10 (Q-10 CO-ENZYME PO) Take  by mouth every day.      Cholecalciferol (D3) 1000 UNIT Cap Take  by mouth every day.      Omega-3 Fatty Acids (OMEGA 3 PO) Take 1,280 mg by mouth in the morning, at noon, and at bedtime.      tadalafil (CIALIS) 5 MG tablet Take 1 Tablet by mouth every day for 360 days. 90 Tablet 3    finasteride (PROSCAR) 5 MG Tab Take 1 Tablet by mouth every day. 90 Tablet 3    famotidine (PEPCID) 40 MG Tab Take 40 mg by mouth every day.      cholestyramine (QUESTRAN) 4 g packet Take 1 Packet by mouth every day.      cycloSPORINE (RESTASIS) 0.05 % ophthalmic emulsion Administer 1 Drop into both eyes 2 times a day.      ciprofloxacin (CIPRO) 750 MG Tab Take 1 Tablet by mouth 2 times a day for 14 days. (Patient not taking: Reported on 5/20/2025) 28 Tablet 0    gabapentin (NEURONTIN) 100 MG Cap Take 1 Capsule by mouth 3 times a day. (Patient not taking: Reported on 5/20/2025) 90 Capsule 11    [DISCONTINUED] atorvastatin (LIPITOR) 40 MG Tab Take 1 Tablet by mouth every evening. 100 Tablet 4     No facility-administered encounter medications on file as of 5/20/2025.     Review of Systems   Constitutional:  Negative for chills, fever, malaise/fatigue and weight loss.   HENT:  Negative for ear discharge, ear pain, hearing loss and nosebleeds.    Eyes:  Negative for blurred vision, double vision, pain and discharge.   Respiratory:  Negative for cough and shortness of breath.    Cardiovascular:  Negative for chest pain, palpitations, orthopnea, claudication, leg swelling and PND.   Gastrointestinal:  Negative for abdominal pain, blood in stool, melena, nausea and vomiting.   Genitourinary:  Negative for dysuria and hematuria.   Musculoskeletal:  Negative for falls, joint pain and myalgias.   Skin:  Negative for itching and rash.   Neurological:  Negative for dizziness, sensory change, speech change, loss of consciousness and headaches.   Endo/Heme/Allergies:  Negative  "for environmental allergies. Does not bruise/bleed easily.   Psychiatric/Behavioral:  Negative for depression, hallucinations and suicidal ideas.               Objective     /58 (BP Location: Left arm, Patient Position: Sitting, BP Cuff Size: Adult)   Pulse 97   Resp 16   Ht 1.803 m (5' 11\")   Wt 61.7 kg (136 lb)   SpO2 96%   BMI 18.97 kg/m²     Physical Exam  Vitals and nursing note reviewed.   Constitutional:       General: He is not in acute distress.     Appearance: He is not diaphoretic.   HENT:      Head: Normocephalic and atraumatic.      Right Ear: External ear normal.      Left Ear: External ear normal.      Nose: No congestion or rhinorrhea.   Eyes:      General:         Right eye: No discharge.         Left eye: No discharge.   Neck:      Thyroid: No thyromegaly.      Vascular: Carotid bruit present. No JVD.   Cardiovascular:      Rate and Rhythm: Normal rate and regular rhythm.      Pulses: Normal pulses.      Heart sounds: Murmur heard.   Pulmonary:      Effort: No respiratory distress.   Abdominal:      General: There is no distension.      Tenderness: There is no abdominal tenderness.   Musculoskeletal:         General: No swelling or tenderness.      Right lower leg: No edema.      Left lower leg: No edema.   Skin:     General: Skin is warm and dry.   Neurological:      Mental Status: He is alert and oriented to person, place, and time.      Cranial Nerves: No cranial nerve deficit.   Psychiatric:         Behavior: Behavior normal.                Assessment & Plan     1. Pure hypercholesterolemia  Basic Metabolic Panel    LIPID PANEL      2. Elevated coronary artery calcium score  atorvastatin (LIPITOR) 40 MG Tab      3. Coronary artery disease involving native coronary artery of native heart without angina pectoris  atorvastatin (LIPITOR) 40 MG Tab      4. Agatston coronary artery calcium score greater than 400 [R93.1]  atorvastatin (LIPITOR) 40 MG Tab              Medical Decision " Making: Today's Assessment/Status/Plan:   At this time patient is clinically stable in terms of his cardiac standpoint.    At this time patient is clinically stable in terms of his cardiac standpoint.    Will continue Atorvastatin to 40 mg daily. LDL is good.    This visit encounter signifies the visit complexity inherent to evaluation and management associated with medical care services that serve as the continuing focal point for all needed health care services and/or with medical care services that are part of ongoing care related to this patient's single, serious condition, complex cardiac condition.    Thomas Khan M.D.

## 2025-05-23 LAB
A1AT PHENOTYP SERPL-IMP: NORMAL
A1AT SERPL-MCNC: 170 MG/DL (ref 90–200)

## 2025-06-11 ENCOUNTER — APPOINTMENT (OUTPATIENT)
Dept: URBAN - METROPOLITAN AREA CLINIC 4 | Facility: CLINIC | Age: 80
Setting detail: DERMATOLOGY
End: 2025-06-11

## 2025-06-11 DIAGNOSIS — Z71.89 OTHER SPECIFIED COUNSELING: ICD-10-CM

## 2025-06-11 DIAGNOSIS — L81.4 OTHER MELANIN HYPERPIGMENTATION: ICD-10-CM

## 2025-06-11 DIAGNOSIS — D22 MELANOCYTIC NEVI: ICD-10-CM

## 2025-06-11 DIAGNOSIS — D18.0 HEMANGIOMA: ICD-10-CM

## 2025-06-11 DIAGNOSIS — L82.1 OTHER SEBORRHEIC KERATOSIS: ICD-10-CM

## 2025-06-11 PROBLEM — D22.9 MELANOCYTIC NEVI, UNSPECIFIED: Status: ACTIVE | Noted: 2025-06-11

## 2025-06-11 PROBLEM — D18.01 HEMANGIOMA OF SKIN AND SUBCUTANEOUS TISSUE: Status: ACTIVE | Noted: 2025-06-11

## 2025-06-11 PROCEDURE — ? COUNSELING

## 2025-06-11 ASSESSMENT — LOCATION ZONE DERM: LOCATION ZONE: FACE

## 2025-06-11 ASSESSMENT — LOCATION DETAILED DESCRIPTION DERM: LOCATION DETAILED: LEFT MEDIAL ZYGOMA

## 2025-06-11 ASSESSMENT — LOCATION SIMPLE DESCRIPTION DERM: LOCATION SIMPLE: LEFT ZYGOMA

## 2025-06-17 LAB
FINAL REPORT Q0603: NORMAL
PRELIMINARY RPT Q0601: NORMAL
RHODAMINE-AURAMINE STN SPEC: NORMAL

## 2025-06-25 ASSESSMENT — ENCOUNTER SYMPTOMS
WHEEZING: 1
SINUS PAIN: 0
MYALGIAS: 0
CHILLS: 0
SPUTUM PRODUCTION: 0
FALLS: 0
DIZZINESS: 0
DIARRHEA: 0
WEAKNESS: 0
SHORTNESS OF BREATH: 1
HEADACHES: 0
FEVER: 0
PALPITATIONS: 0
COUGH: 1
NAUSEA: 0
WEIGHT LOSS: 1
HEMOPTYSIS: 0
DIAPHORESIS: 0
VOMITING: 0
HEARTBURN: 0

## 2025-06-25 NOTE — PROGRESS NOTES
Pulmonary Clinic Note    Date of Visit: 6/26/2025     Chief Complaint:  Chief Complaint   Patient presents with    Follow-Up     Last seen 5/9/25 w/ Cherelle Barba for Centrilobular emphysema     Labs 5/19/25     HPI:   Clem Soto is a very pleasant 80 y.o. year old male never smoker, with a PMHx of bronchiectasis, granulomatous lung disease, pleural calcifications, pulmonary nodules, Mild pectus excavatum, mitral valve prolapse with moderate mitral regurgitation, coronary artery calcifications, diverticulosis who presented to the Pulmonary Clinic for a regular follow up. Last seen in the office on 5/9/2025 with myself.     Patient is followed by the pulmonary office for abnormal CT.  CT chest was performed in 7/2024, personally reviewed demonstrating apical scarring suggestive of old granulomatous disease with associated pleural calcifications and tree-in-bud opacities with bronchial wall thickening and mild bronchiectasis in the right upper lobe, as well as a 5 mm nodule in the lingula.   PFTs in 2025 show normal spirometry with air trapping with a FEV1 2.  By 3 L or 89%, FEV1/FVC 81%, %, DLCO 105% predicted, without positive bronchodilator response.  Immunoglobulins, cocci panel, histoplasmosis, RA, QuantiFERON, HLA-B27 are unremarkable.  Patient was bronched in April 2025 for concern of NTM.  Bronch culture came back positive for Pseudomonas which was pansensitive.  Fungal smears are negative.  AFB  cultures were negative.  Alpha 1 antitrypsin level 188 with M1M1 phenotype.     Interval events:  5/9/2025-  Patient states that he has shortness of breath with mMRC of 4 and has a cough and occasional wheezing.  He does state that he is fatigued and has had a decrease in his exercise stamina.  He denies any significant sputum production, fevers, or chills.     6/26/2025-  Patient was apparently referred to [facility] by his primary care for a second opinion.  Patient has not taking the ciprofloxacin, due  to his primary care advising against it.  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  He has not had any improvement in his symptoms since starting on the regimen.  They also had patient hold the ciprofloxacin with directions that if he started to have increase in symptoms to take the medication.  Symptomatically, patient is short of breath on exertion and denies any cough, sputum production, or wheezing.  Patient does attribute his shortness of breath to long COVID, as this is when his symptoms first started.  Patient has been using his hypertonic saline followed by the flutter valve and has not noticed any increase in sputum production and denies any chest congestion.      Oxygen use:  None     MMRC ndGndrndanddndend:nd nd2nd Past Medical History:   Diagnosis Date    Arthritis     osteoarthritis neck and back    Breath shortness     Cough     COVID     Pt believes he has had long covid since May 2023    Enlarged prostate     High cholesterol     Stomach ache     at night, takes pepcid    Wheezing     very wild, with sleeping     Past Surgical History:   Procedure Laterality Date    CA BRONCHOSCOPY,DIAGNOSTIC N/A 4/21/2025    Procedure: FIBER OPTIC BRONCHOSCOPY WITH  WASH, BRUSH, BRONCHOALVEOLAR LAVAGE, BIOPSY AND FINE NEEDLE ASPIRATION;  Surgeon: Candice Alejandra D.O.;  Location: SURGERY [facility];  Service: Pulmonary    LUMBAR LAMINECTOMY DISKECTOMY       Social History     Socioeconomic History    Marital status:      Spouse name: Not on file    Number of children: Not on file    Years of education: Not on file    Highest education level: Not on file   Occupational History    Not on file   Tobacco Use    Smoking status: Never    Smokeless tobacco: Never   Vaping Use    Vaping status: Never Used   Substance and Sexual Activity    Alcohol use: Yes     Alcohol/week: 0.6 oz     Types: 1 Cans of beer per  week     Comment: 1 beer every 3 weeks    Drug use: Not Currently    Sexual activity: Not on file     Comment: , 1 daughter, no grands   Other Topics Concern    Not on file   Social History Narrative    Not on file     Social Drivers of Health     Financial Resource Strain: Low Risk  (11/12/2023)    Received from [facility]    Overall Financial Resource Strain (CARDIA)     Difficulty of Paying Living Expenses: Not hard at all   Food Insecurity: No Food Insecurity (11/12/2023)    Received from [facility]    Hunger Vital Sign     Worried About Running Out of Food in the Last Year: Never true     Ran Out of Food in the Last Year: Never true   Transportation Needs: No Transportation Needs (11/12/2023)    Received from [facility]    PRAPARE - Transportation     Lack of Transportation (Medical): No     Lack of Transportation (Non-Medical): No   Physical Activity: Insufficiently Active (11/12/2023)    Received from [facility]    Exercise Vital Sign     Days of Exercise per Week: 3 days     Minutes of Exercise per Session: 30 min   Stress: No Stress Concern Present (11/12/2023)    Received from [facility]    [employer] [place] [employer] - Occupational Stress Questionnaire     Feeling of Stress : Not at all   Social Connections: Moderately Integrated (11/12/2023)    Received from [facility]    Social Connection and Isolation Panel [NHANES]     Frequency of Communication with Friends and Family: Twice a week     Frequency of Social Gatherings with Friends and Family: Once a week     Attends Anabaptism Services: Never     Active Member of Clubs or Organizations: No     Attends Club or Organization Meetings: 1 to 4 times per year     Marital Status:    Intimate Partner Violence: Not At Risk (11/12/2023)    Received from [facility]    Humiliation, Afraid, Rape, and Kick questionnaire     Fear of Current or Ex-Partner: No     Emotionally Abused: No     Physically Abused: No     Sexually Abused: No    Housing Stability: Low Risk  (11/12/2023)    Received from [facility]    Housing Stability Vital Sign     Unable to Pay for Housing in the Last Year: No     Number of Places Lived in the Last Year: 1     Unstable Housing in the Last Year: No        History reviewed. No pertinent family history.  Current Outpatient Medications on File Prior to Visit   Medication Sig Dispense Refill    azithromycin (ZITHROMAX) 500 MG tablet TAKE 0.5 TABLETS (250 MG TOTAL) BY MOUTH 3 (THREE) TIMES A WEEK ON MONDAYS, WEDNESDAYS, AND FRIDAYS      fluticasone (FLOVENT HFA) 220 MCG/ACT Aerosol Inhale 2 Puffs.      montelukast (SINGULAIR) 10 MG Tab Take 10 mg by mouth every day.      atorvastatin (LIPITOR) 40 MG Tab Take 1 Tablet by mouth every evening. 100 Tablet 4    testosterone cypionate (DEPO-TESTOSTERONE) 200 MG/ML injection ADMINISTER 1 ML INTRAMUSCULARLY EVERY 2 WEEKS      Sodium Chloride 3.5 % Nebu Soln Inhale 4 mL 2 times a day. 720 mL 11    albuterol 108 (90 Base) MCG/ACT Aero Soln inhalation aerosol INHALE 2 PUFFS BY INHALATION EVERY 6 HOURS AS NEEDED FOR SHORTNESS OF BREATH/WHEEZING      ondansetron (ZOFRAN ODT) 8 MG TABLET DISPERSIBLE PLACE 1 TABLET TABLET UNDER THE TONGUE TWICE DAILY AS NEEDED FOR NAUSEA/VOMITING      megestrol (MEGACE) 40 MG/ML Suspension Take 200 mg by mouth every day.      traMADol (ULTRAM) 50 MG Tab TAKE 1 TABLET BY MOUTH THREE TIMES A DAY AS NEEDED FOR 30 DAYS      ASPIRIN 81 PO Take  by mouth every day.      Coenzyme Q10 (Q-10 CO-ENZYME PO) Take  by mouth every day.      Cholecalciferol (D3) 1000 UNIT Cap Take  by mouth every day.      Omega-3 Fatty Acids (OMEGA 3 PO) Take 1,280 mg by mouth in the morning, at noon, and at bedtime.      tadalafil (CIALIS) 5 MG tablet Take 1 Tablet by mouth every day for 360 days. 90 Tablet 3    finasteride (PROSCAR) 5 MG Tab Take 1 Tablet by mouth every day. 90 Tablet 3    famotidine (PEPCID) 40 MG Tab Take 40 mg by mouth every day.      cholestyramine (QUESTRAN) 4 g  "packet Take 1 Packet by mouth every day.      cycloSPORINE (RESTASIS) 0.05 % ophthalmic emulsion Administer 1 Drop into both eyes 2 times a day.       No current facility-administered medications on file prior to visit.     Allergies: Patient has no known allergies.    ROS:   Review of Systems   Constitutional:  Positive for malaise/fatigue and weight loss. Negative for chills, diaphoresis and fever.   HENT:  Negative for congestion and sinus pain.    Respiratory:  Positive for cough, shortness of breath and wheezing. Negative for hemoptysis and sputum production.    Cardiovascular:  Negative for chest pain, palpitations and leg swelling.   Gastrointestinal:  Negative for diarrhea, heartburn, nausea and vomiting.   Musculoskeletal:  Negative for falls and myalgias.   Neurological:  Negative for dizziness, weakness and headaches.     Vitals:  /62 (BP Location: Left arm, Patient Position: Sitting, BP Cuff Size: Adult)   Pulse 94   Ht 1.803 m (5' 11\")   Wt 61.2 kg (135 lb)   SpO2 96%     Physical Exam  Constitutional:       General: He is not in acute distress.     Appearance: Normal appearance. He is not ill-appearing, toxic-appearing or diaphoretic.   Cardiovascular:      Rate and Rhythm: Normal rate and regular rhythm.      Pulses: Normal pulses.      Heart sounds: Normal heart sounds. No murmur heard.     No friction rub. No gallop.   Pulmonary:      Effort: Pulmonary effort is normal. No respiratory distress.      Breath sounds: Normal breath sounds. No stridor. No wheezing, rhonchi or rales.   Musculoskeletal:         General: No swelling.      Right lower leg: No edema.      Left lower leg: No edema.   Skin:     General: Skin is warm.   Neurological:      General: No focal deficit present.      Mental Status: He is alert and oriented to person, place, and time.   Psychiatric:         Mood and Affect: Mood normal.         Behavior: Behavior normal.         Thought Content: Thought content normal.        "  Judgment: Judgment normal.         Laboratory Data:  PFTs: (Date: 2/4/2025)-      Impression:  Normal spirometry with significant air trapping. This is consistent with the reported history of bronchiolitis. Correlate with imaging and exam.     CT Chest: (Date: 4/18/2025)-  Impression:  Lungs: Moderate emphysematous changes.  Bronchiectasis in both upper lobes with minimal bronchial secretions in the anterior medial RIGHT upper lobe.  Ill-defined opacity in the periphery of the RIGHT upper lobe posterolaterally, consistent with scar.    Bronchiectasis and bronchial secretions in the lingula and RIGHT middle lobe.  Groundglass opacities in the lower lobes, more apparent on the RIGHT.     Mediastinum/Michelle: No significant adenopathy.     Pleura: Bilateral apical pleural scarring with calcified plaque, unchanged.     Cardiac: Heart normal in size without pericardial effusion. There is coronary artery calcification.     Vascular: Unremarkable.     Soft tissues: Unremarkable.     Bones: Degenerative change of thoracic spine with mild kyphosis.      ECHO: (Date: 4/28/2025)-  Impression:  Compared to the prior study on 10/30/2024, no changes.   Normal left ventricular systolic function.   No evidence of valvular abnormality based on Doppler evaluation.      Assessment and Plan:    Problem List Items Addressed This Visit       Abnormal CT scan of lung - Primary    CT chest was performed in 7/2024, personally reviewed demonstrating apical scarring suggestive of old granulomatous disease with associated pleural calcifications and tree-in-bud opacities with bronchial wall thickening and mild bronchiectasis in the right upper lobe, as well as a 5 mm nodule in the lingula.  Immunoglobulins, cocci panel, histoplasmosis, RA, QuantiFERON, HLA-B27 are unremarkable.  Patient was bronched in April 2025 for concern of NTM.  Bronch culture came back positive for Pseudomonas which was pansensitive.  Fungal smears are negative.  AFB   cultures were negative.  Alpha 1 antitrypsin level 188 with M1M1 phenotype.  Patient was prescribed ciprofloxacin but was advised by [facility] as well as his primary care to hold this medication.  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  --Advised patient to continue to follow-up with [facility] and continue to stay on their current medication regimen.  It also appears that they ordered him to have sputum and AFB cultures done at their facility.  --Advised patient that he can drop the airway clearance to once a day, since he is not producing any sputum and does not have any chest congestion.  I did advise him to increase it back to 2 times a day if he starts become symptomatic.  --I did refer the patient to pulmonary rehab for long COVID longhauler syndrome   --Advised patient increase exercise as tolerated  --Advised patient remain up-to-date on all vaccines         Adult BMI <19 kg/sq m    Pseudomonas aeruginosa infection    Bronchiectasis without complication (HCC)    COVID-19 long hauler    Relevant Orders    Referral to Pulmonary Rehab     Diagnostic studies have been reviewed with the patient.    Return in about 6 months (around 12/26/2025), or if symptoms worsen or fail to improve, for bronchiectasis, with Freda.     This note was generated using voice recognition software which has a chance of producing errors of grammar and possibly content.  I have made every reasonable attempt to find and correct any obvious errors, but it should be expected that some may not be found prior to finalization of this note.    Time spent in record review prior to patient arrival, reviewing results, and in face-to-face encounter totaled 30 min.  __________  SHIRA Alonso  Pulmonary Medicine  [facility]

## 2025-06-26 ENCOUNTER — OFFICE VISIT (OUTPATIENT)
Dept: SLEEP MEDICINE | Facility: MEDICAL CENTER | Age: 80
End: 2025-06-26
Payer: MEDICARE

## 2025-06-26 VITALS
BODY MASS INDEX: 18.9 KG/M2 | HEART RATE: 94 BPM | WEIGHT: 135 LBS | SYSTOLIC BLOOD PRESSURE: 118 MMHG | OXYGEN SATURATION: 96 % | HEIGHT: 71 IN | DIASTOLIC BLOOD PRESSURE: 62 MMHG

## 2025-06-26 DIAGNOSIS — A49.8 PSEUDOMONAS AERUGINOSA INFECTION: ICD-10-CM

## 2025-06-26 DIAGNOSIS — R91.8 ABNORMAL CT SCAN OF LUNG: Primary | ICD-10-CM

## 2025-06-26 DIAGNOSIS — U09.9 COVID-19 LONG HAULER: ICD-10-CM

## 2025-06-26 DIAGNOSIS — J47.9 BRONCHIECTASIS WITHOUT COMPLICATION (HCC): ICD-10-CM

## 2025-06-26 PROCEDURE — 99214 OFFICE O/P EST MOD 30 MIN: CPT

## 2025-06-26 PROCEDURE — 3074F SYST BP LT 130 MM HG: CPT

## 2025-06-26 PROCEDURE — 99213 OFFICE O/P EST LOW 20 MIN: CPT

## 2025-06-26 PROCEDURE — 3078F DIAST BP <80 MM HG: CPT

## 2025-06-26 RX ORDER — MONTELUKAST SODIUM 10 MG/1
10 TABLET ORAL DAILY
COMMUNITY
Start: 2025-05-27 | End: 2026-05-27

## 2025-06-26 RX ORDER — AZITHROMYCIN 500 MG/1
TABLET, FILM COATED ORAL
COMMUNITY

## 2025-06-26 RX ORDER — FLUTICASONE PROPIONATE 220 UG/1
2 AEROSOL, METERED RESPIRATORY (INHALATION)
COMMUNITY
Start: 2025-05-27

## 2025-06-26 ASSESSMENT — FIBROSIS 4 INDEX: FIB4 SCORE: 1.03

## 2025-06-26 NOTE — ASSESSMENT & PLAN NOTE
CT chest was performed in 7/2024, personally reviewed demonstrating apical scarring suggestive of old granulomatous disease with associated pleural calcifications and tree-in-bud opacities with bronchial wall thickening and mild bronchiectasis in the right upper lobe, as well as a 5 mm nodule in the lingula.  Immunoglobulins, cocci panel, histoplasmosis, RA, QuantiFERON, HLA-B27 are unremarkable.  Patient was bronched in April 2025 for concern of NTM.  Bronch culture came back positive for Pseudomonas which was pansensitive.  Fungal smears are negative.  AFB  cultures were negative.  Alpha 1 antitrypsin level 188 with M1M1 phenotype.  Patient was prescribed ciprofloxacin but was advised by [facility] as well as his primary care to hold this medication.  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  --Advised patient to continue to follow-up with [facility] and continue to stay on their current medication regimen.  It also appears that they ordered him to have sputum and AFB cultures done at their facility.  --Advised patient that he can drop the airway clearance to once a day, since he is not producing any sputum and does not have any chest congestion.  I did advise him to increase it back to 2 times a day if he starts become symptomatic.  --I did refer the patient to pulmonary rehab for long COVID longhauler syndrome   --Advised patient increase exercise as tolerated  --Advised patient remain up-to-date on all vaccines

## 2025-07-10 NOTE — Clinical Note
REFERRAL APPROVAL NOTICE         Sent on July 10, 2025                   Pérez Soto  [address] [place] [address]                   Dear Mr. Soto,    After a careful review of the medical information and benefit coverage, [facility] has processed your referral. See below for additional details.    If applicable, you must be actively enrolled with your insurance for coverage of the authorized service. If you have any questions regarding your coverage, please contact your insurance directly.    REFERRAL INFORMATION   Referral #:  [id number]  Referred-To Department    Referred-By Provider:  Pulmonology    HALLE Lisa   Pulmonary Rehab [facility]      [address]  [address]  [place] [address]  [phone] [id number]  [place] [address]  [phone]    Referral Start Date:  06/26/2025  Referral End Date:   06/26/2026             SCHEDULING  If you do not already have an appointment, please call [phone] to make an appointment.     MORE INFORMATION  If you do not already have a [employer] account, sign up at: [email or web address]  You can access your medical information, make appointments, see lab results, billing information, and more.  If you have questions regarding this referral, please contact  the [facility] Referrals department at:             [phone]. Monday - Friday 8:00AM - 5:00PM.     Sincerely,    [facility]

## 2025-07-15 ENCOUNTER — HOSPITAL ENCOUNTER (OUTPATIENT)
Facility: MEDICAL CENTER | Age: 80
End: 2025-07-15
Attending: INTERNAL MEDICINE
Payer: MEDICARE

## 2025-07-15 LAB
ALBUMIN SERPL BCP-MCNC: 3.4 G/DL (ref 3.2–4.9)
ALBUMIN/GLOB SERPL: 1.1 G/DL
ALP SERPL-CCNC: 73 U/L (ref 30–99)
ALT SERPL-CCNC: 37 U/L (ref 2–50)
ANION GAP SERPL CALC-SCNC: 11 MMOL/L (ref 7–16)
AST SERPL-CCNC: 31 U/L (ref 12–45)
BILIRUB SERPL-MCNC: 0.7 MG/DL (ref 0.1–1.5)
BUN SERPL-MCNC: 28 MG/DL (ref 8–22)
CALCIUM ALBUM COR SERPL-MCNC: 9.2 MG/DL (ref 8.5–10.5)
CALCIUM SERPL-MCNC: 8.7 MG/DL (ref 8.5–10.5)
CHLORIDE SERPL-SCNC: 104 MMOL/L (ref 96–112)
CO2 SERPL-SCNC: 23 MMOL/L (ref 20–33)
CREAT SERPL-MCNC: 0.68 MG/DL (ref 0.5–1.4)
ERYTHROCYTE [DISTWIDTH] IN BLOOD BY AUTOMATED COUNT: 57.1 FL (ref 35.9–50)
EST. AVERAGE GLUCOSE BLD GHB EST-MCNC: 114 MG/DL
GFR SERPLBLD CREATININE-BSD FMLA CKD-EPI: 93 ML/MIN/1.73 M 2
GLOBULIN SER CALC-MCNC: 3.1 G/DL (ref 1.9–3.5)
GLUCOSE SERPL-MCNC: 86 MG/DL (ref 65–99)
HBA1C MFR BLD: 5.6 % (ref 4–5.6)
HCT VFR BLD AUTO: 46.5 % (ref 42–52)
HGB BLD-MCNC: 15.6 G/DL (ref 14–18)
MCH RBC QN AUTO: 32.8 PG (ref 27–33)
MCHC RBC AUTO-ENTMCNC: 33.5 G/DL (ref 32.3–36.5)
MCV RBC AUTO: 97.9 FL (ref 81.4–97.8)
PLATELET # BLD AUTO: 327 K/UL (ref 164–446)
PMV BLD AUTO: 8.6 FL (ref 9–12.9)
POTASSIUM SERPL-SCNC: 4.2 MMOL/L (ref 3.6–5.5)
PROT SERPL-MCNC: 6.5 G/DL (ref 6–8.2)
PSA SERPL-MCNC: 2.08 NG/ML (ref 0–4)
RBC # BLD AUTO: 4.75 M/UL (ref 4.7–6.1)
SODIUM SERPL-SCNC: 138 MMOL/L (ref 135–145)
WBC # BLD AUTO: 7.9 K/UL (ref 4.8–10.8)

## 2025-07-15 PROCEDURE — 84270 ASSAY OF SEX HORMONE GLOBUL: CPT

## 2025-07-15 PROCEDURE — 84153 ASSAY OF PSA TOTAL: CPT | Mod: GA

## 2025-07-15 PROCEDURE — 84402 ASSAY OF FREE TESTOSTERONE: CPT

## 2025-07-15 PROCEDURE — 36415 COLL VENOUS BLD VENIPUNCTURE: CPT

## 2025-07-15 PROCEDURE — 83036 HEMOGLOBIN GLYCOSYLATED A1C: CPT | Mod: GA

## 2025-07-15 PROCEDURE — 80053 COMPREHEN METABOLIC PANEL: CPT

## 2025-07-15 PROCEDURE — 85027 COMPLETE CBC AUTOMATED: CPT

## 2025-07-15 PROCEDURE — 84403 ASSAY OF TOTAL TESTOSTERONE: CPT

## 2025-07-18 LAB
SHBG SERPL-SCNC: 71 NMOL/L (ref 19–76)
TESTOST FREE MFR SERPL: 1.2 % (ref 1.6–2.9)
TESTOST FREE SERPL-MCNC: 71 PG/ML (ref 47–244)
TESTOST SERPL-MCNC: 603 NG/DL (ref 300–720)

## 2025-08-18 ENCOUNTER — HOSPITAL ENCOUNTER (OUTPATIENT)
Dept: PULMONOLOGY | Facility: MEDICAL CENTER | Age: 80
End: 2025-08-18
Payer: MEDICARE

## 2025-08-18 PROCEDURE — G0237 THERAPEUTIC PROCD STRG ENDUR: HCPCS

## 2025-08-18 PROCEDURE — 99211 OFF/OP EST MAY X REQ PHY/QHP: CPT | Mod: 25

## (undated) DEVICE — BRONCHOSCOPE EXALT MODEL B REGULAR (10EA/BX)

## (undated) DEVICE — TOWEL STOP TIMEOUT SAFETY FLAG (40EA/CA)

## (undated) DEVICE — SPONGE GAUZESTER 4 X 4 4PLY - (128PK/CA)

## (undated) DEVICE — COVER LIGHT HANDLE FLEXIBLE - SOFT (2EA/PK 80PK/CA)